# Patient Record
Sex: FEMALE | Race: BLACK OR AFRICAN AMERICAN | NOT HISPANIC OR LATINO | Employment: FULL TIME | URBAN - METROPOLITAN AREA
[De-identification: names, ages, dates, MRNs, and addresses within clinical notes are randomized per-mention and may not be internally consistent; named-entity substitution may affect disease eponyms.]

---

## 2018-06-29 ENCOUNTER — HOSPITAL ENCOUNTER (OUTPATIENT)
Dept: RADIOLOGY | Facility: OTHER | Age: 48
Discharge: HOME | End: 2018-06-29
Attending: OBSTETRICS & GYNECOLOGY
Payer: COMMERCIAL

## 2018-06-29 VITALS — HEIGHT: 62 IN | BODY MASS INDEX: 29.08 KG/M2 | WEIGHT: 158 LBS

## 2018-06-29 DIAGNOSIS — Z12.31 VISIT FOR SCREENING MAMMOGRAM: ICD-10-CM

## 2018-06-29 PROCEDURE — 77067 SCR MAMMO BI INCL CAD: CPT | Mod: 26,,, | Performed by: RADIOLOGY

## 2018-06-29 PROCEDURE — 77067 SCR MAMMO BI INCL CAD: CPT | Mod: TC

## 2018-06-29 PROCEDURE — 77063 BREAST TOMOSYNTHESIS BI: CPT | Mod: 26,,, | Performed by: RADIOLOGY

## 2018-11-25 ENCOUNTER — OFFICE VISIT (OUTPATIENT)
Dept: URGENT CARE | Facility: CLINIC | Age: 48
End: 2018-11-25
Payer: COMMERCIAL

## 2018-11-25 VITALS
SYSTOLIC BLOOD PRESSURE: 129 MMHG | HEIGHT: 62 IN | OXYGEN SATURATION: 99 % | BODY MASS INDEX: 26.68 KG/M2 | WEIGHT: 145 LBS | DIASTOLIC BLOOD PRESSURE: 78 MMHG | HEART RATE: 72 BPM | RESPIRATION RATE: 16 BRPM | TEMPERATURE: 98 F

## 2018-11-25 DIAGNOSIS — K21.9 GASTROESOPHAGEAL REFLUX DISEASE, ESOPHAGITIS PRESENCE NOT SPECIFIED: Primary | ICD-10-CM

## 2018-11-25 DIAGNOSIS — M79.601 RIGHT ARM PAIN: ICD-10-CM

## 2018-11-25 PROCEDURE — 99203 OFFICE O/P NEW LOW 30 MIN: CPT | Mod: S$GLB,,, | Performed by: NURSE PRACTITIONER

## 2018-11-25 PROCEDURE — 3008F BODY MASS INDEX DOCD: CPT | Mod: CPTII,S$GLB,, | Performed by: NURSE PRACTITIONER

## 2018-11-25 RX ORDER — LIDOCAINE HYDROCHLORIDE 20 MG/ML
5 SOLUTION OROPHARYNGEAL
Status: COMPLETED | OUTPATIENT
Start: 2018-11-25 | End: 2018-11-25

## 2018-11-25 RX ORDER — DIPHENHYDRAMINE HCL 12.5MG/5ML
10 LIQUID (ML) ORAL
Status: COMPLETED | OUTPATIENT
Start: 2018-11-25 | End: 2018-11-25

## 2018-11-25 RX ORDER — MAG HYDROX/ALUMINUM HYD/SIMETH 200-200-20
15 SUSPENSION, ORAL (FINAL DOSE FORM) ORAL
Status: COMPLETED | OUTPATIENT
Start: 2018-11-25 | End: 2018-11-25

## 2018-11-25 RX ORDER — CYCLOBENZAPRINE HCL 5 MG
5 TABLET ORAL 3 TIMES DAILY PRN
Qty: 20 TABLET | Refills: 0 | Status: SHIPPED | OUTPATIENT
Start: 2018-11-25 | End: 2018-12-05

## 2018-11-25 RX ORDER — OMEPRAZOLE 20 MG/1
20 CAPSULE, DELAYED RELEASE ORAL 2 TIMES DAILY
Qty: 60 CAPSULE | Refills: 1 | Status: SHIPPED | OUTPATIENT
Start: 2018-11-25 | End: 2020-08-25

## 2018-11-25 RX ORDER — SIMETHICONE 80 MG
80 TABLET,CHEWABLE ORAL EVERY 6 HOURS PRN
Refills: 0 | COMMUNITY
Start: 2018-11-25 | End: 2020-08-25

## 2018-11-25 RX ADMIN — Medication 10 MG: at 10:11

## 2018-11-25 RX ADMIN — Medication 15 ML: at 10:11

## 2018-11-25 RX ADMIN — LIDOCAINE HYDROCHLORIDE 5 ML: 20 SOLUTION OROPHARYNGEAL at 10:11

## 2018-11-25 NOTE — PATIENT INSTRUCTIONS
Tips to Control Acid Reflux    To control acid reflux, youll need to make some basic diet and lifestyle changes. The simple steps outlined below may be all youll need to ease discomfort.  Watch what you eat  · Avoid fatty foods and spicy foods.  · Eat fewer acidic foods, such as citrus and tomato-based foods. These can increase symptoms.  · Limit drinking alcohol, caffeine, and fizzy beverages. All increase acid reflux.  · Try limiting chocolate, peppermint, and spearmint. These can worsen acid reflux in some people.  Watch when you eat  · Avoid lying down for 3 hours after eating.  · Do not snack before going to bed.  Raise your head  Raising your head and upper body by 4 to 6 inches helps limit reflux when youre lying down. Put blocks under the head of your bed frame to raise it.  Other changes  · Lose weight, if you need to  · Dont exercise near bedtime  · Avoid tight-fitting clothes  · Limit aspirin and ibuprofen  · Stop smoking   Date Last Reviewed: 7/1/2016 © 2000-2017 [employer]. [address] [place] [address]. All rights reserved. This information is not intended as a substitute for professional medical care. Always follow your healthcare professional's instructions.        Muscle Strain in the Extremities  A muscle strain is a stretching and tearing of muscle fibers. This causes pain, especially when you move that muscle. There may also be some swelling and bruising.  Home care  · Keep the hurt area raised to reduce pain and swelling. This is especially important during the first 48 hours.  · Apply an ice pack over the injured area for 15 to 20 minutes every 3 to 6 hours. You should do this for the first 24 to 48 hours. You can make an ice pack by filling a plastic bag that seals at the top with ice cubes and then wrapping it with a thin towel. Be careful not to injure your skin with the ice treatments. Ice should never be applied directly to skin. Continue the use of ice  packs for relief of pain and swelling as needed. After 48 hours, apply heat (warm shower or warm bath) for 15 to 20 minutes several times a day, or alternate ice and heat.  · You may use over-the-counter pain medicine to control pain, unless another medicine was prescribed. If you have chronic liver or kidney disease or ever had a stomach ulcer or GI bleeding, talk with your healthcare provider before using these medicines.  · For leg strains: If crutches have been recommended, dont put full weight on the hurt leg until you can do so without pain. You can return to sports when you are able to hop and run on the injured leg without pain.  Follow-up care  Follow up with your healthcare provider, or as advised.  When to seek medical advice  Call your healthcare provider right away if any of these occur:  · The toes of the injured leg become swollen, cold, blue, numb, or tingly  · Pain or swelling increases  Date Last Reviewed: 11/19/2015  © 1270-3716 [employer]. [address] [place] [address]. All rights reserved. This information is not intended as a substitute for professional medical care. Always follow your healthcare professional's instructions.    Please return here or go to the Emergency Department for any concerns or worsening of condition.  If you were prescribed antibiotics, please take them to completion.  If you were prescribed a narcotic medication, do not drive or operate heavy equipment or machinery while taking these medications.  Please drink plenty of fluids.  Please get plenty of rest.  If you were not prescribed any reflux medications, it is OK to take over the counter Pepcid or Zantac or Prilosec as directed.  Please follow up with your primary care doctor or specialist as needed.    If you  smoke, please stop smoking.

## 2018-11-25 NOTE — PROGRESS NOTES
"Subjective:       Patient ID: Maggie Evans is a 48 y.o. female.    Vitals:  height is 5' 2" (1.575 m) and weight is 65.8 kg (145 lb). Her temperature is 98.3 °F (36.8 °C). Her blood pressure is 129/78 and her pulse is 72. Her respiration is 16 and oxygen saturation is 99%.     Chief Complaint: Chest Pain    Per pt:    RIGHT arm soreness x 4-5 mo. Intensifies when leaning, laying on the RIGHT side    RIGHT-sided CP x approx. 2 mo      Chest Pain    This is a new problem. The current episode started more than 1 month ago. The onset quality is gradual. Pertinent negatives include no abdominal pain, back pain, fever, nausea, palpitations, shortness of breath, syncope or vomiting.       Constitution: Negative for chills, fatigue, fever and international travel in last 60 days.   HENT: Negative for trouble swallowing.    Neck: Negative for neck pain.   Cardiovascular: Positive for chest pain. Negative for chest trauma, leg swelling, palpitations and passing out.   Respiratory: Negative for sleep apnea and shortness of breath.    Gastrointestinal: Negative for abdominal pain, nausea, vomiting and heartburn.   Genitourinary: Negative for history of kidney stones.   Musculoskeletal: Positive for joint pain. Negative for back pain.   Skin: Negative for rash.   Neurological: Negative for light-headedness and passing out.   Hematologic/Lymphatic: Negative for history of blood clots.   Psychiatric/Behavioral: Negative for nervous/anxious. The patient is not nervous/anxious.        Objective:      Physical Exam   Constitutional: She is oriented to person, place, and time. She appears well-developed and well-nourished. She is cooperative.  Non-toxic appearance. She does not appear ill. No distress.   HENT:   Head: Normocephalic and atraumatic.   Right Ear: Hearing, tympanic membrane, external ear and ear canal normal.   Left Ear: Hearing, tympanic membrane, external ear and ear canal normal.   Nose: Nose normal. No mucosal " edema, rhinorrhea or nasal deformity. No epistaxis. Right sinus exhibits no maxillary sinus tenderness and no frontal sinus tenderness. Left sinus exhibits no maxillary sinus tenderness and no frontal sinus tenderness.   Mouth/Throat: Uvula is midline, oropharynx is clear and moist and mucous membranes are normal. No trismus in the jaw. Normal dentition. No uvula swelling. No posterior oropharyngeal erythema.   Eyes: Conjunctivae and lids are normal. Right eye exhibits no discharge. Left eye exhibits no discharge. No scleral icterus.   Sclera clear bilat   Neck: Trachea normal, normal range of motion, full passive range of motion without pain and phonation normal. Neck supple.   Cardiovascular: Normal rate, regular rhythm, normal heart sounds, intact distal pulses and normal pulses.   Pulmonary/Chest: Effort normal and breath sounds normal. No stridor. No respiratory distress. She has no decreased breath sounds. She has no wheezes. She has no rhonchi. She has no rales.   Abdominal: Soft. Normal appearance and bowel sounds are normal. She exhibits no distension, no pulsatile midline mass and no mass. There is no tenderness.   Musculoskeletal: Normal range of motion. She exhibits no edema or deformity.        Right shoulder: She exhibits pain and spasm.        Right upper arm: She exhibits tenderness.   Neurological: She is alert and oriented to person, place, and time. She exhibits normal muscle tone. Coordination normal.   Skin: Skin is warm, dry and intact. She is not diaphoretic. No pallor.   Psychiatric: She has a normal mood and affect. Her speech is normal and behavior is normal. Judgment and thought content normal. Cognition and memory are normal.   Nursing note and vitals reviewed.      Assessment:       1. Gastroesophageal reflux disease, esophagitis presence not specified    2. Right arm pain        Plan:         Gastroesophageal reflux disease, esophagitis presence not specified  -     Ambulatory referral  to Gastroenterology    Right arm pain  -     Ambulatory referral to Orthopedics    Other orders  -     lidocaine HCl 2% oral solution 5 mL  -     diphenhydrAMINE 12.5 mg/5 mL liquid 10 mg  -     aluminum-magnesium hydroxide-simethicone 200-200-20 mg/5 mL suspension 15 mL  -     omeprazole (PRILOSEC) 20 MG capsule; Take 1 capsule (20 mg total) by mouth 2 (two) times daily.  Dispense: 60 capsule; Refill: 1  -     simethicone (MYLICON) 80 MG chewable tablet; Take 1 tablet (80 mg total) by mouth every 6 (six) hours as needed for Flatulence.; Refill: 0  -     cyclobenzaprine (FLEXERIL) 5 MG tablet; Take 1 tablet (5 mg total) by mouth 3 (three) times daily as needed for Muscle spasms.  Dispense: 20 tablet; Refill: 0      Pt right sided CP dissipated after gi cocktail.  Etiology most likely GERD.  Will refer to gi for CP.  Right arm and shoulder hurt and with testing it is suggestive of a rotator cuff problem.  Will refer to ortho for evaluation.      Tips to Control Acid Reflux    To control acid reflux, youll need to make some basic diet and lifestyle changes. The simple steps outlined below may be all youll need to ease discomfort.  Watch what you eat  · Avoid fatty foods and spicy foods.  · Eat fewer acidic foods, such as citrus and tomato-based foods. These can increase symptoms.  · Limit drinking alcohol, caffeine, and fizzy beverages. All increase acid reflux.  · Try limiting chocolate, peppermint, and spearmint. These can worsen acid reflux in some people.  Watch when you eat  · Avoid lying down for 3 hours after eating.  · Do not snack before going to bed.  Raise your head  Raising your head and upper body by 4 to 6 inches helps limit reflux when youre lying down. Put blocks under the head of your bed frame to raise it.  Other changes  · Lose weight, if you need to  · Dont exercise near bedtime  · Avoid tight-fitting clothes  · Limit aspirin and ibuprofen  · Stop smoking   Date Last Reviewed: 7/1/2016  ©  2806-4603 [employer]. [address] [place] [address], [place] [address]. All rights reserved. This information is not intended as a substitute for professional medical care. Always follow your healthcare professional's instructions.        Muscle Strain in the Extremities  A muscle strain is a stretching and tearing of muscle fibers. This causes pain, especially when you move that muscle. There may also be some swelling and bruising.  Home care  · Keep the hurt area raised to reduce pain and swelling. This is especially important during the first 48 hours.  · Apply an ice pack over the injured area for 15 to 20 minutes every 3 to 6 hours. You should do this for the first 24 to 48 hours. You can make an ice pack by filling a plastic bag that seals at the top with ice cubes and then wrapping it with a thin towel. Be careful not to injure your skin with the ice treatments. Ice should never be applied directly to skin. Continue the use of ice packs for relief of pain and swelling as needed. After 48 hours, apply heat (warm shower or warm bath) for 15 to 20 minutes several times a day, or alternate ice and heat.  · You may use over-the-counter pain medicine to control pain, unless another medicine was prescribed. If you have chronic liver or kidney disease or ever had a stomach ulcer or GI bleeding, talk with your healthcare provider before using these medicines.  · For leg strains: If crutches have been recommended, dont put full weight on the hurt leg until you can do so without pain. You can return to sports when you are able to hop and run on the injured leg without pain.  Follow-up care  Follow up with your healthcare provider, or as advised.  When to seek medical advice  Call your healthcare provider right away if any of these occur:  · The toes of the injured leg become swollen, cold, blue, numb, or tingly  · Pain or swelling increases  Date Last Reviewed: 11/19/2015  © 2948-8790 [employer]  [address] [place] [address]. All rights reserved. This information is not intended as a substitute for professional medical care. Always follow your healthcare professional's instructions.    Please return here or go to the Emergency Department for any concerns or worsening of condition.  If you were prescribed antibiotics, please take them to completion.  If you were prescribed a narcotic medication, do not drive or operate heavy equipment or machinery while taking these medications.  Please drink plenty of fluids.  Please get plenty of rest.  If you were not prescribed any reflux medications, it is OK to take over the counter Pepcid or Zantac or Prilosec as directed.  Please follow up with your primary care doctor or specialist as needed.    If you  smoke, please stop smoking.

## 2018-12-03 ENCOUNTER — TELEPHONE (OUTPATIENT)
Dept: ORTHOPEDICS | Facility: CLINIC | Age: 48
End: 2018-12-03

## 2018-12-03 NOTE — TELEPHONE ENCOUNTER
Notified pt. Regarding appointment 12/03/18; need to know more about C/O. RDrake Maxwell PA-C is a trauma PA.  Correct appointment with [facility] with CESAR Batista PA-C on 12/05/18 at 2:15 pm. Patient states verbal understanding and has no further questions.

## 2018-12-04 ENCOUNTER — TELEPHONE (OUTPATIENT)
Dept: ORTHOPEDICS | Facility: CLINIC | Age: 48
End: 2018-12-04

## 2018-12-04 ENCOUNTER — TELEPHONE (OUTPATIENT)
Dept: GASTROENTEROLOGY | Facility: CLINIC | Age: 48
End: 2018-12-04

## 2018-12-04 DIAGNOSIS — K21.9 GASTROESOPHAGEAL REFLUX DISEASE, ESOPHAGITIS PRESENCE NOT SPECIFIED: Primary | ICD-10-CM

## 2018-12-04 NOTE — TELEPHONE ENCOUNTER
----- Message from Mindy B Seipel, RN sent at 12/4/2018  8:51 AM CST -----  Pt ask for appt to be cancelled on 12/17/18 at 1:30pm with Dr. Sheffield. She wants to see her primary care MD 1st.  Thanks

## 2019-07-10 ENCOUNTER — HOSPITAL ENCOUNTER (OUTPATIENT)
Dept: RADIOLOGY | Facility: OTHER | Age: 49
Discharge: HOME | End: 2019-07-10
Attending: OBSTETRICS & GYNECOLOGY
Payer: COMMERCIAL

## 2019-07-10 VITALS — BODY MASS INDEX: 26.68 KG/M2 | WEIGHT: 145 LBS | HEIGHT: 62 IN

## 2019-07-10 DIAGNOSIS — Z12.31 VISIT FOR SCREENING MAMMOGRAM: ICD-10-CM

## 2019-07-10 PROCEDURE — 77067 SCR MAMMO BI INCL CAD: CPT | Mod: 26,,, | Performed by: RADIOLOGY

## 2019-07-10 PROCEDURE — 77063 MAMMO DIGITAL SCREENING BILAT WITH TOMOSYNTHESIS_CAD: ICD-10-PCS | Mod: 26,,, | Performed by: RADIOLOGY

## 2019-07-10 PROCEDURE — 77063 BREAST TOMOSYNTHESIS BI: CPT | Mod: 26,,, | Performed by: RADIOLOGY

## 2019-07-10 PROCEDURE — 77067 MAMMO DIGITAL SCREENING BILAT WITH TOMOSYNTHESIS_CAD: ICD-10-PCS | Mod: 26,,, | Performed by: RADIOLOGY

## 2019-07-10 PROCEDURE — 77067 SCR MAMMO BI INCL CAD: CPT | Mod: TC

## 2019-12-16 ENCOUNTER — OFFICE VISIT (OUTPATIENT)
Dept: OPTOMETRY | Facility: CLINIC | Age: 49
End: 2019-12-16
Payer: COMMERCIAL

## 2019-12-16 DIAGNOSIS — Z46.0 FITTING AND ADJUSTMENT OF SPECTACLES AND CONTACT LENSES: Primary | ICD-10-CM

## 2019-12-16 DIAGNOSIS — Z01.00 EYE EXAM, ROUTINE: Primary | ICD-10-CM

## 2019-12-16 DIAGNOSIS — H52.4 MYOPIA OF BOTH EYES WITH REGULAR ASTIGMATISM AND PRESBYOPIA: ICD-10-CM

## 2019-12-16 DIAGNOSIS — H52.223 MYOPIA OF BOTH EYES WITH REGULAR ASTIGMATISM AND PRESBYOPIA: ICD-10-CM

## 2019-12-16 DIAGNOSIS — H52.13 MYOPIA OF BOTH EYES WITH REGULAR ASTIGMATISM AND PRESBYOPIA: ICD-10-CM

## 2019-12-16 PROCEDURE — 92004 PR EYE EXAM, NEW PATIENT,COMPREHESV: ICD-10-PCS | Mod: S$GLB,,, | Performed by: OPTOMETRIST

## 2019-12-16 PROCEDURE — 92310 CONTACT LENS FITTING OU: CPT | Mod: CSM,,, | Performed by: OPTOMETRIST

## 2019-12-16 PROCEDURE — 92310 PR CONTACT LENS FITTING (NO CHANGE): ICD-10-PCS | Mod: CSM,,, | Performed by: OPTOMETRIST

## 2019-12-16 PROCEDURE — 99999 PR PBB SHADOW E&M-EST. PATIENT-LVL II: ICD-10-PCS | Mod: PBBFAC,,, | Performed by: OPTOMETRIST

## 2019-12-16 PROCEDURE — 92015 DETERMINE REFRACTIVE STATE: CPT | Mod: S$GLB,,, | Performed by: OPTOMETRIST

## 2019-12-16 PROCEDURE — 92015 PR REFRACTION: ICD-10-PCS | Mod: S$GLB,,, | Performed by: OPTOMETRIST

## 2019-12-16 PROCEDURE — 99999 PR PBB SHADOW E&M-EST. PATIENT-LVL II: CPT | Mod: PBBFAC,,, | Performed by: OPTOMETRIST

## 2019-12-16 PROCEDURE — 92004 COMPRE OPH EXAM NEW PT 1/>: CPT | Mod: S$GLB,,, | Performed by: OPTOMETRIST

## 2019-12-16 NOTE — PROGRESS NOTES
HPI     New pt here to establish eye care. Here for routine eye exam. Pt wears   soft c/l (Frequency 55 Toric). Not very satisfied w/ them since they move   frequently, makes it difficult to adjust. Pt broke her most current rx   glasses. Currently wearing older pair. Pt has high astigmatism. No other   ocular complaints.     NO eye sx.   No eye gtts.     CLs DW, 3-4+mo, Opti-free,   Spectera CLs          Last edited by Mahamed Dimas, OD on 12/16/2019  2:19 PM. (History)            Assessment /Plan     For exam results, see Encounter Report.    Eye exam, routine  -Spectera  -PVD left  -Cataract OU    Myopia of both eyes with regular astigmatism and presbyopia  Eyeglass Final Rx     Eyeglass Final Rx       Sphere Cylinder Axis Dist VA Add    Right -8.75 +3.00 100 20/20 +1.75    Left -8.75 +3.50 095 20/20 +1.75    Type:  PAL    Expiration Date:  12/16/2020              Order #2, needs CLFU at dispense    RTC dispense

## 2020-01-06 ENCOUNTER — OFFICE VISIT (OUTPATIENT)
Dept: OPTOMETRY | Facility: CLINIC | Age: 50
End: 2020-01-06
Payer: COMMERCIAL

## 2020-01-06 DIAGNOSIS — H52.4 MYOPIA OF BOTH EYES WITH REGULAR ASTIGMATISM AND PRESBYOPIA: Primary | ICD-10-CM

## 2020-01-06 DIAGNOSIS — H52.13 MYOPIA OF BOTH EYES WITH REGULAR ASTIGMATISM AND PRESBYOPIA: Primary | ICD-10-CM

## 2020-01-06 DIAGNOSIS — H52.223 MYOPIA OF BOTH EYES WITH REGULAR ASTIGMATISM AND PRESBYOPIA: Primary | ICD-10-CM

## 2020-01-06 PROCEDURE — 92499 PR CONTACT LENS F/U LEV 1: ICD-10-PCS | Mod: CSM,,, | Performed by: OPTOMETRIST

## 2020-01-06 PROCEDURE — 99499 NO LOS: ICD-10-PCS | Mod: S$GLB,,, | Performed by: OPTOMETRIST

## 2020-01-06 PROCEDURE — 99499 UNLISTED E&M SERVICE: CPT | Mod: S$GLB,,, | Performed by: OPTOMETRIST

## 2020-01-06 PROCEDURE — 92499 UNLISTED OPH SVC/PROCEDURE: CPT | Mod: CSM,,, | Performed by: OPTOMETRIST

## 2020-01-06 NOTE — PROGRESS NOTES
HPI     clfu and clpu     Last edited by KESHAWN Weiner on 1/6/2020  8:10 AM. (History)            Assessment /Plan     For exam results, see Encounter Report.    Myopia of both eyes with regular astigmatism and presbyopia  -Good initial comfort and VA  -order exchange lens OS only, Ok to dispense  -PHREV      RTC annual

## 2020-01-31 ENCOUNTER — TELEPHONE (OUTPATIENT)
Dept: OPTOMETRY | Facility: CLINIC | Age: 50
End: 2020-01-31

## 2020-01-31 NOTE — TELEPHONE ENCOUNTER
----- Message from KESHAWN Weiner sent at 1/31/2020  8:57 AM CST -----  Regarding: FW: AMANDA 1/30  Ok to final  ----- Message -----  From: Mahamed Dimas OD  Sent: 1/6/2020   8:26 AM CST  To: Judie Irby Staff  Subject: AMANDA 1/30                                       Call for final.

## 2020-01-31 NOTE — TELEPHONE ENCOUNTER
Final    Contact Lens Final Rx     Final Contact Lens Rx       Brand Base Curve Diameter Sphere Cylinder Axis    Right Bauch and Lomb Ultra for Astigmatism 8.60 14.5 -5.50 -2.25 010    Left Bauch and Lomb Ultra for Astigmatism 8.60 14.5 -5.00 -2.75 010    Expiration Date:  1/31/2021    Replacement:  Monthly    Solutions:  OptiFree PureMoist    Wearing Schedule:  Daily wear

## 2020-08-25 ENCOUNTER — OFFICE VISIT (OUTPATIENT)
Dept: INTERNAL MEDICINE | Facility: CLINIC | Age: 50
End: 2020-08-25
Attending: INTERNAL MEDICINE
Payer: COMMERCIAL

## 2020-08-25 VITALS
WEIGHT: 161 LBS | HEART RATE: 88 BPM | HEIGHT: 62 IN | DIASTOLIC BLOOD PRESSURE: 80 MMHG | SYSTOLIC BLOOD PRESSURE: 120 MMHG | OXYGEN SATURATION: 98 % | BODY MASS INDEX: 29.63 KG/M2

## 2020-08-25 DIAGNOSIS — M79.652 LEFT THIGH PAIN: ICD-10-CM

## 2020-08-25 DIAGNOSIS — Z00.00 ROUTINE ADULT HEALTH MAINTENANCE: ICD-10-CM

## 2020-08-25 DIAGNOSIS — Z13.39 SCREENING FOR ALCOHOLISM: ICD-10-CM

## 2020-08-25 DIAGNOSIS — M54.50 RIGHT-SIDED LOW BACK PAIN WITHOUT SCIATICA, UNSPECIFIED CHRONICITY: Primary | ICD-10-CM

## 2020-08-25 DIAGNOSIS — Z13.31 SCREENING FOR DEPRESSION: ICD-10-CM

## 2020-08-25 PROCEDURE — 99386 PREV VISIT NEW AGE 40-64: CPT | Mod: S$GLB,,, | Performed by: INTERNAL MEDICINE

## 2020-08-25 PROCEDURE — G0442 ANNUAL ALCOHOL SCREEN 15 MIN: HCPCS | Mod: S$GLB,,, | Performed by: INTERNAL MEDICINE

## 2020-08-25 PROCEDURE — G0442 PR  ALCOHOL SCREENING: ICD-10-PCS | Mod: S$GLB,,, | Performed by: INTERNAL MEDICINE

## 2020-08-25 PROCEDURE — 99386 PR PREVENTIVE VISIT,NEW,40-64: ICD-10-PCS | Mod: S$GLB,,, | Performed by: INTERNAL MEDICINE

## 2020-08-25 PROCEDURE — 3008F BODY MASS INDEX DOCD: CPT | Mod: CPTII,S$GLB,, | Performed by: INTERNAL MEDICINE

## 2020-08-25 PROCEDURE — 3008F PR BODY MASS INDEX (BMI) DOCUMENTED: ICD-10-PCS | Mod: CPTII,S$GLB,, | Performed by: INTERNAL MEDICINE

## 2020-08-25 NOTE — PATIENT INSTRUCTIONS
Tips for Healthy Living and Routine Preventative Care - 2020                                                            (These guidelines are intended for healthy adults)      1. Exercise  Exercise aerobically with a target heart rate of (220-age) x 0.8  Exercise 30-45 minutes on most days of the week    2. Diet and Supplements- All supplements can be obtained through a varied, healthy diet   Calcium: 1,000 - 1,200 mg each day   8 oz milk, Calcium fortified O.J., or Yogurt = 300 mg, 1oz of cheese =100-200 mg  Vitamin D: 800 iu each day- Can probably be obtained by 30 min. of direct sunlight    each day             3 oz. [place] = 800 iu,  3 oz. Tuna =150 iu, Milk or fortified O.J. = 120 iu  Fish oil: 1-2 grams each day or about 840 mg of EPA and DHA (Omega-3 fatty acids) each day             3 oz. [place]=2 grams,  3 oz. Tuna=1.3 grams,  3 oz. drained light Tuna= 0.25 grams  Folic acid 800 mcg each day for all women planning or capable of pregnancy    3. Lifestyle  Alcohol: 1 drink = 12 oz. domestic beer, 4 oz. wine, or 1 oz. hard (80 proof) liquor             Males: </= 14 drinks per week with no more than 4 in any one day             Females: </= 7 drinks per week with no more than 3 in any one day  Salt: 1.2 - 3 grams of Sodium each day.  Tobacco: Dont smoke, or quit smoking (discuss with your doctor)  Depression: If you feel depressed discuss with your doctor  Weight: Maintain a healthy body weight. Stay within 10% of:             Males: 106 lbs. + 6 lbs per inch height above 5 feet             Females: 100 lbs + 5 lbs per inch height above 5 feet    4. Routine tests  Blood pressure check at each visit, or at least once each year  HIV screening (one time) if less than 65 years old  Hepatitis C screen (one time) between the age of 18 and 79  Cholesterol screening every 3 years starting at age 21  Glucose check every 2-3 years starting at age 45  TSH (thyroid screen) every 2 years starting at age 50  Colonoscopy at  age 50, and repeat every 10 years until age 75  Vision screen at age 65    Females:   Gyn exam with cervical HPV test every 3 years or Pap smear every three years starting at age 25                  Stop screening at age 65 if past 3 exams were normal                  No screening for women who have had a hysterectomy with removal of cervix  Mammogram every 1-2 years starting at age 40 (or age 50) until age 75.  Bone density scan at about age 65      Males:  Consider PSA screening annually at age 50, age 45 for  Americans, until age 75                 5. Immunizations  Influenza vaccine every year in the fall, especially if >50 or with a chronic disease  Tetnus/Diptheria/Pertusis (Tdap) vaccine once (after the age of 18), then Tetnus/Diptheria (Td) vaccine every 10 years  Shingles (Shingrix) vaccine after age 50 and get a 2nd dose after 2-6 months  Pneumonia vaccine at age 65. 2nd Pneumonia vaccine at least 1 year later (1st should be Prevnar-13, and 2nd should be Pneumovax-23).
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
## 2021-01-04 DIAGNOSIS — Z12.31 OTHER SCREENING MAMMOGRAM: ICD-10-CM

## 2021-02-25 ENCOUNTER — OFFICE VISIT (OUTPATIENT)
Dept: INTERNAL MEDICINE | Facility: CLINIC | Age: 51
End: 2021-02-25
Attending: INTERNAL MEDICINE
Payer: COMMERCIAL

## 2021-02-25 VITALS
BODY MASS INDEX: 28.16 KG/M2 | OXYGEN SATURATION: 98 % | HEART RATE: 106 BPM | DIASTOLIC BLOOD PRESSURE: 72 MMHG | HEIGHT: 62 IN | SYSTOLIC BLOOD PRESSURE: 118 MMHG | WEIGHT: 153 LBS

## 2021-02-25 DIAGNOSIS — Z00.00 ROUTINE ADULT HEALTH MAINTENANCE: Primary | ICD-10-CM

## 2021-02-25 DIAGNOSIS — E55.9 VITAMIN D DEFICIENCY: ICD-10-CM

## 2021-02-25 DIAGNOSIS — Z13.31 SCREENING FOR DEPRESSION: ICD-10-CM

## 2021-02-25 DIAGNOSIS — M79.652 PAIN OF LEFT THIGH: ICD-10-CM

## 2021-02-25 DIAGNOSIS — Z87.09 HISTORY OF PNEUMOTHORAX: ICD-10-CM

## 2021-02-25 DIAGNOSIS — E03.9 HYPOTHYROIDISM, UNSPECIFIED TYPE: ICD-10-CM

## 2021-02-25 DIAGNOSIS — D51.0 PERNICIOUS ANEMIA: ICD-10-CM

## 2021-02-25 DIAGNOSIS — E78.9 DISORDER OF LIPID METABOLISM: ICD-10-CM
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
## 2021-02-25 DIAGNOSIS — D50.8 OTHER IRON DEFICIENCY ANEMIA: ICD-10-CM

## 2021-02-25 DIAGNOSIS — Z13.39 SCREENING FOR ALCOHOLISM: ICD-10-CM

## 2021-02-25 DIAGNOSIS — R06.02 SOB (SHORTNESS OF BREATH): ICD-10-CM

## 2021-02-25 DIAGNOSIS — R79.89 OTHER SPECIFIED ABNORMAL FINDINGS OF BLOOD CHEMISTRY: ICD-10-CM

## 2021-02-25 DIAGNOSIS — M54.2 NECK PAIN ON LEFT SIDE: ICD-10-CM

## 2021-02-25 PROCEDURE — 3008F PR BODY MASS INDEX (BMI) DOCUMENTED: ICD-10-PCS | Mod: CPTII,S$GLB,, | Performed by: INTERNAL MEDICINE

## 2021-02-25 PROCEDURE — 1160F RVW MEDS BY RX/DR IN RCRD: CPT | Mod: S$GLB,,, | Performed by: INTERNAL MEDICINE

## 2021-02-25 PROCEDURE — 1160F PR REVIEW ALL MEDS BY PRESCRIBER/CLIN PHARMACIST DOCUMENTED: ICD-10-PCS | Mod: S$GLB,,, | Performed by: INTERNAL MEDICINE

## 2021-02-25 PROCEDURE — 99396 PREV VISIT EST AGE 40-64: CPT | Mod: S$GLB,,, | Performed by: INTERNAL MEDICINE

## 2021-02-25 PROCEDURE — 99213 PR OFFICE/OUTPT VISIT, EST, LEVL III, 20-29 MIN: ICD-10-PCS | Mod: 25,S$GLB,, | Performed by: INTERNAL MEDICINE

## 2021-02-25 PROCEDURE — 99396 PR PREVENTIVE VISIT,EST,40-64: ICD-10-PCS | Mod: S$GLB,,, | Performed by: INTERNAL MEDICINE

## 2021-02-25 PROCEDURE — 3008F BODY MASS INDEX DOCD: CPT | Mod: CPTII,S$GLB,, | Performed by: INTERNAL MEDICINE

## 2021-02-25 PROCEDURE — G0442 PR  ALCOHOL SCREENING: ICD-10-PCS | Mod: 59,S$GLB,, | Performed by: INTERNAL MEDICINE

## 2021-02-25 PROCEDURE — 99213 OFFICE O/P EST LOW 20 MIN: CPT | Mod: 25,S$GLB,, | Performed by: INTERNAL MEDICINE

## 2021-02-25 PROCEDURE — G0442 ANNUAL ALCOHOL SCREEN 15 MIN: HCPCS | Mod: 59,S$GLB,, | Performed by: INTERNAL MEDICINE

## 2021-02-26 LAB
25(OH)D3 SERPL-MCNC: 30 NG/ML (ref 30–100)
ALBUMIN SERPL-MCNC: 4.3 G/DL (ref 3.6–5.1)
ALBUMIN/GLOB SERPL: 1.5 (CALC) (ref 1–2.5)
ALP SERPL-CCNC: 84 U/L (ref 37–153)
ALT SERPL-CCNC: 17 U/L (ref 6–29)
AST SERPL-CCNC: 16 U/L (ref 10–35)
BASOPHILS # BLD AUTO: 30 CELLS/UL (ref 0–200)
BASOPHILS NFR BLD AUTO: 0.8 %
BILIRUB SERPL-MCNC: 0.8 MG/DL (ref 0.2–1.2)
BUN SERPL-MCNC: 17 MG/DL (ref 7–25)
BUN/CREAT SERPL: ABNORMAL (CALC) (ref 6–22)
CALCIUM SERPL-MCNC: 9.2 MG/DL (ref 8.6–10.4)
CHLORIDE SERPL-SCNC: 104 MMOL/L (ref 98–110)
CHOLEST SERPL-MCNC: 230 MG/DL
CHOLEST/HDLC SERPL: 3.2 (CALC)
CK SERPL-CCNC: 46 U/L (ref 29–143)
CO2 SERPL-SCNC: 27 MMOL/L (ref 20–32)
CREAT SERPL-MCNC: 0.65 MG/DL (ref 0.5–1.05)
D DIMER PPP FEU-MCNC: 0.41 MCG/ML FEU
EOSINOPHIL # BLD AUTO: 61 CELLS/UL (ref 15–500)
EOSINOPHIL NFR BLD AUTO: 1.6 %
ERYTHROCYTE [DISTWIDTH] IN BLOOD BY AUTOMATED COUNT: 16.3 % (ref 11–15)
GFRSERPLBLD MDRD-ARVRAT: 103 ML/MIN/1.73M2
GLOBULIN SER CALC-MCNC: 2.9 G/DL (CALC) (ref 1.9–3.7)
GLUCOSE SERPL-MCNC: 101 MG/DL (ref 65–99)
HBA1C MFR BLD: 5.6 % OF TOTAL HGB
HCT VFR BLD AUTO: 37.8 % (ref 35–45)
HCV AB S/CO SERPL IA: 0.02
HCV AB SERPL QL IA: NORMAL
HDLC SERPL-MCNC: 73 MG/DL
HGB BLD-MCNC: 13 G/DL (ref 11.7–15.5)
HIV 1+2 AB+HIV1 P24 AG SERPL QL IA: NORMAL
LDLC SERPL CALC-MCNC: 138 MG/DL (CALC)
LYMPHOCYTES # BLD AUTO: 969 CELLS/UL (ref 850–3900)
LYMPHOCYTES NFR BLD AUTO: 25.5 %
MCH RBC QN AUTO: 27.4 PG (ref 27–33)
MCHC RBC AUTO-ENTMCNC: 34.4 G/DL (ref 32–36)
MCV RBC AUTO: 79.7 FL (ref 80–100)
MONOCYTES # BLD AUTO: 266 CELLS/UL (ref 200–950)
MONOCYTES NFR BLD AUTO: 7 %
NEUTROPHILS # BLD AUTO: 2474 CELLS/UL (ref 1500–7800)
NEUTROPHILS NFR BLD AUTO: 65.1 %
NONHDLC SERPL-MCNC: 157 MG/DL (CALC)
PLATELET # BLD AUTO: 313 THOUSAND/UL (ref 140–400)
PMV BLD REES-ECKER: 9.4 FL (ref 7.5–12.5)
POTASSIUM SERPL-SCNC: 3.8 MMOL/L (ref 3.5–5.3)
PROT SERPL-MCNC: 7.2 G/DL (ref 6.1–8.1)
RBC # BLD AUTO: 4.74 MILLION/UL (ref 3.8–5.1)
SODIUM SERPL-SCNC: 141 MMOL/L (ref 135–146)
TRIGL SERPL-MCNC: 87 MG/DL
TSH SERPL-ACNC: 1.38 MIU/L
VIT B12 SERPL-MCNC: 430 PG/ML (ref 200–1100)
WBC # BLD AUTO: 3.8 THOUSAND/UL (ref 3.8–10.8)

## 2021-03-05 ENCOUNTER — HOSPITAL ENCOUNTER (OUTPATIENT)
Dept: RADIOLOGY | Facility: OTHER | Age: 51
Discharge: HOME | End: 2021-03-05
Attending: INTERNAL MEDICINE
Payer: COMMERCIAL

## 2021-03-05 DIAGNOSIS — Z12.31 OTHER SCREENING MAMMOGRAM: ICD-10-CM

## 2021-03-05 PROCEDURE — 77063 MAMMO DIGITAL SCREENING BILAT WITH TOMO: ICD-10-PCS | Mod: 26,,, | Performed by: RADIOLOGY

## 2021-03-05 PROCEDURE — 77067 SCR MAMMO BI INCL CAD: CPT | Mod: 26,,, | Performed by: RADIOLOGY

## 2021-03-05 PROCEDURE — 77067 SCR MAMMO BI INCL CAD: CPT | Mod: TC

## 2021-03-05 PROCEDURE — 77067 MAMMO DIGITAL SCREENING BILAT WITH TOMO: ICD-10-PCS | Mod: 26,,, | Performed by: RADIOLOGY

## 2021-03-05 PROCEDURE — 77063 BREAST TOMOSYNTHESIS BI: CPT | Mod: 26,,, | Performed by: RADIOLOGY

## 2021-04-13 ENCOUNTER — PATIENT MESSAGE (OUTPATIENT)
Dept: RESEARCH | Facility: HOSPITAL | Age: 51
End: 2021-04-13

## 2022-02-25 ENCOUNTER — OFFICE VISIT (OUTPATIENT)
Dept: INTERNAL MEDICINE | Facility: CLINIC | Age: 52
End: 2022-02-25
Attending: INTERNAL MEDICINE
Payer: COMMERCIAL

## 2022-02-25 VITALS
WEIGHT: 140 LBS | OXYGEN SATURATION: 97 % | HEIGHT: 62 IN | HEART RATE: 77 BPM | DIASTOLIC BLOOD PRESSURE: 74 MMHG | BODY MASS INDEX: 25.76 KG/M2 | SYSTOLIC BLOOD PRESSURE: 110 MMHG

## 2022-02-25 DIAGNOSIS — D51.0 PERNICIOUS ANEMIA: ICD-10-CM

## 2022-02-25 DIAGNOSIS — R79.89 OTHER SPECIFIED ABNORMAL FINDINGS OF BLOOD CHEMISTRY: ICD-10-CM

## 2022-02-25 DIAGNOSIS — Z00.00 ROUTINE ADULT HEALTH MAINTENANCE: ICD-10-CM

## 2022-02-25 DIAGNOSIS — H93.8X2 CONGESTION OF LEFT EAR: ICD-10-CM

## 2022-02-25 DIAGNOSIS — E78.9 DISORDER OF LIPID METABOLISM: ICD-10-CM

## 2022-02-25 DIAGNOSIS — Z12.11 COLON CANCER SCREENING: Primary | ICD-10-CM

## 2022-02-25 DIAGNOSIS — Z00.00 ROUTINE ADULT HEALTH MAINTENANCE: Primary | ICD-10-CM

## 2022-02-25 DIAGNOSIS — E03.9 HYPOTHYROIDISM, UNSPECIFIED TYPE: ICD-10-CM

## 2022-02-25 DIAGNOSIS — D50.8 OTHER IRON DEFICIENCY ANEMIA: ICD-10-CM

## 2022-02-25 DIAGNOSIS — E55.9 VITAMIN D DEFICIENCY: ICD-10-CM

## 2022-02-25 PROCEDURE — 1159F PR MEDICATION LIST DOCUMENTED IN MEDICAL RECORD: ICD-10-PCS | Mod: CPTII,S$GLB,, | Performed by: INTERNAL MEDICINE

## 2022-02-25 PROCEDURE — 3008F PR BODY MASS INDEX (BMI) DOCUMENTED: ICD-10-PCS | Mod: CPTII,S$GLB,, | Performed by: INTERNAL MEDICINE

## 2022-02-25 PROCEDURE — 99396 PR PREVENTIVE VISIT,EST,40-64: ICD-10-PCS | Mod: S$GLB,,, | Performed by: INTERNAL MEDICINE

## 2022-02-25 PROCEDURE — 3078F PR MOST RECENT DIASTOLIC BLOOD PRESSURE < 80 MM HG: ICD-10-PCS | Mod: CPTII,S$GLB,, | Performed by: INTERNAL MEDICINE

## 2022-02-25 PROCEDURE — 3074F SYST BP LT 130 MM HG: CPT | Mod: CPTII,S$GLB,, | Performed by: INTERNAL MEDICINE

## 2022-02-25 PROCEDURE — 3008F BODY MASS INDEX DOCD: CPT | Mod: CPTII,S$GLB,, | Performed by: INTERNAL MEDICINE

## 2022-02-25 PROCEDURE — 3078F DIAST BP <80 MM HG: CPT | Mod: CPTII,S$GLB,, | Performed by: INTERNAL MEDICINE

## 2022-02-25 PROCEDURE — 1160F RVW MEDS BY RX/DR IN RCRD: CPT | Mod: CPTII,S$GLB,, | Performed by: INTERNAL MEDICINE

## 2022-02-25 PROCEDURE — 99396 PREV VISIT EST AGE 40-64: CPT | Mod: S$GLB,,, | Performed by: INTERNAL MEDICINE

## 2022-02-25 PROCEDURE — 1160F PR REVIEW ALL MEDS BY PRESCRIBER/CLIN PHARMACIST DOCUMENTED: ICD-10-PCS | Mod: CPTII,S$GLB,, | Performed by: INTERNAL MEDICINE

## 2022-02-25 PROCEDURE — 1159F MED LIST DOCD IN RCRD: CPT | Mod: CPTII,S$GLB,, | Performed by: INTERNAL MEDICINE

## 2022-02-25 PROCEDURE — 3074F PR MOST RECENT SYSTOLIC BLOOD PRESSURE < 130 MM HG: ICD-10-PCS | Mod: CPTII,S$GLB,, | Performed by: INTERNAL MEDICINE

## 2022-02-25 NOTE — PROGRESS NOTES
Subjective:       Patient ID: Maggie Evans is a 51 y.o. female.    Chief Complaint: Annual Exam    Occasionally her left ear feels congested, especially when on an airplane.  She has lost 13 lb in the past year.      Adult Wellness Exam:    Mental Conditions: None  Depression Risk Factors: None  BMI: See Vital signs   Colon screen:    See Health Maintenance Report      Females: Mammogram and PAP: per Gyn                                 Vaccines (Flu, Adacel, Shingrix): See Health Maintenance Report  Routine labs (Cholesterol, Glucose/Hgb A1C, and TSH): ordered.     The patient's current health status is: Good   Patient was educated on all medical problems and routine health maintenance. See Patient Instructions.                               Review of Systems   Constitutional: Negative.    Respiratory: Negative for shortness of breath.    Cardiovascular: Negative for chest pain.         Objective:      Physical Exam  Vitals and nursing note reviewed.   Constitutional:       Appearance: She is well-developed.   HENT:      Head: Normocephalic.      Right Ear: Tympanic membrane and ear canal normal.   Eyes:      Pupils: Pupils are equal, round, and reactive to light.   Cardiovascular:      Rate and Rhythm: Normal rate and regular rhythm.      Heart sounds: Normal heart sounds.   Pulmonary:      Effort: Pulmonary effort is normal.   Neurological:      Mental Status: She is alert.         Assessment:       Problem List Items Addressed This Visit    None     Visit Diagnoses     Colon cancer screening    -  Primary    Relevant Orders    Cologuard Screening (Multitarget Stool DNA)    Routine adult health maintenance        Congestion of left ear              Plan:       Per orders and D/C instructions.     check routine labs.

## 2022-02-25 NOTE — PATIENT INSTRUCTIONS

## 2022-02-25 NOTE — PROGRESS NOTES
Occasionally her left ear feels congested, especially when on an airplane.  She has lost 13 lb in the past year.

## 2022-02-26 LAB
ALBUMIN SERPL-MCNC: 4.4 G/DL (ref 3.6–5.1)
ALBUMIN/GLOB SERPL: 1.4 (CALC) (ref 1–2.5)
ALP SERPL-CCNC: 79 U/L (ref 37–153)
ALT SERPL-CCNC: 40 U/L (ref 6–29)
AST SERPL-CCNC: 32 U/L (ref 10–35)
BASOPHILS # BLD AUTO: 41 CELLS/UL (ref 0–200)
BASOPHILS NFR BLD AUTO: 1.2 %
BILIRUB SERPL-MCNC: 1.3 MG/DL (ref 0.2–1.2)
BUN SERPL-MCNC: 10 MG/DL (ref 7–25)
BUN/CREAT SERPL: ABNORMAL (CALC) (ref 6–22)
CALCIUM SERPL-MCNC: 9.7 MG/DL (ref 8.6–10.4)
CHLORIDE SERPL-SCNC: 102 MMOL/L (ref 98–110)
CHOLEST SERPL-MCNC: 248 MG/DL
CHOLEST/HDLC SERPL: 2.8 (CALC)
CO2 SERPL-SCNC: 30 MMOL/L (ref 20–32)
CREAT SERPL-MCNC: 0.64 MG/DL (ref 0.5–1.05)
EOSINOPHIL # BLD AUTO: 82 CELLS/UL (ref 15–500)
EOSINOPHIL NFR BLD AUTO: 2.4 %
ERYTHROCYTE [DISTWIDTH] IN BLOOD BY AUTOMATED COUNT: 16.3 % (ref 11–15)
GLOBULIN SER CALC-MCNC: 3.2 G/DL (CALC) (ref 1.9–3.7)
GLUCOSE SERPL-MCNC: 92 MG/DL (ref 65–99)
HBA1C MFR BLD: 5.4 % OF TOTAL HGB
HCT VFR BLD AUTO: 39.1 % (ref 35–45)
HDLC SERPL-MCNC: 89 MG/DL
HGB BLD-MCNC: 13.4 G/DL (ref 11.7–15.5)
LDLC SERPL CALC-MCNC: 137 MG/DL (CALC)
LYMPHOCYTES # BLD AUTO: 1054 CELLS/UL (ref 850–3900)
LYMPHOCYTES NFR BLD AUTO: 31 %
MCH RBC QN AUTO: 26.8 PG (ref 27–33)
MCHC RBC AUTO-ENTMCNC: 34.3 G/DL (ref 32–36)
MCV RBC AUTO: 78.2 FL (ref 80–100)
MONOCYTES # BLD AUTO: 241 CELLS/UL (ref 200–950)
MONOCYTES NFR BLD AUTO: 7.1 %
NEUTROPHILS # BLD AUTO: 1982 CELLS/UL (ref 1500–7800)
NEUTROPHILS NFR BLD AUTO: 58.3 %
NONHDLC SERPL-MCNC: 159 MG/DL (CALC)
PLATELET # BLD AUTO: 348 THOUSAND/UL (ref 140–400)
PMV BLD REES-ECKER: 9.7 FL (ref 7.5–12.5)
POTASSIUM SERPL-SCNC: 4.6 MMOL/L (ref 3.5–5.3)
PROT SERPL-MCNC: 7.6 G/DL (ref 6.1–8.1)
RBC # BLD AUTO: 5 MILLION/UL (ref 3.8–5.1)
SODIUM SERPL-SCNC: 141 MMOL/L (ref 135–146)
TRIGL SERPL-MCNC: 117 MG/DL
TSH SERPL-ACNC: 1.63 MIU/L
VIT B12 SERPL-MCNC: 354 PG/ML (ref 200–1100)
WBC # BLD AUTO: 3.4 THOUSAND/UL (ref 3.8–10.8)

## 2022-02-28 ENCOUNTER — PATIENT MESSAGE (OUTPATIENT)
Dept: INTERNAL MEDICINE | Facility: CLINIC | Age: 52
End: 2022-02-28
Payer: COMMERCIAL

## 2022-03-16 DIAGNOSIS — Z12.31 OTHER SCREENING MAMMOGRAM: ICD-10-CM

## 2022-03-25 ENCOUNTER — HOSPITAL ENCOUNTER (OUTPATIENT)
Dept: RADIOLOGY | Facility: OTHER | Age: 52
Discharge: HOME | End: 2022-03-25
Attending: OBSTETRICS & GYNECOLOGY
Payer: COMMERCIAL

## 2022-03-25 DIAGNOSIS — Z12.31 VISIT FOR SCREENING MAMMOGRAM: ICD-10-CM

## 2022-03-25 PROCEDURE — 77067 MAMMO DIGITAL SCREENING BILAT WITH TOMO: ICD-10-PCS | Mod: 26,,, | Performed by: RADIOLOGY

## 2022-03-25 PROCEDURE — 77063 BREAST TOMOSYNTHESIS BI: CPT | Mod: 26,,, | Performed by: RADIOLOGY

## 2022-03-25 PROCEDURE — 77063 MAMMO DIGITAL SCREENING BILAT WITH TOMO: ICD-10-PCS | Mod: 26,,, | Performed by: RADIOLOGY

## 2022-03-25 PROCEDURE — 77067 SCR MAMMO BI INCL CAD: CPT | Mod: 26,,, | Performed by: RADIOLOGY

## 2022-03-25 PROCEDURE — 77067 SCR MAMMO BI INCL CAD: CPT | Mod: TC

## 2022-03-29 DIAGNOSIS — Z12.11 SPECIAL SCREENING FOR MALIGNANT NEOPLASMS, COLON: Primary | ICD-10-CM

## 2022-04-21 LAB — NONINV COLON CA DNA+OCC BLD SCRN STL QL: NEGATIVE

## 2022-06-26 ENCOUNTER — OFFICE VISIT (OUTPATIENT)
Dept: URGENT CARE | Facility: CLINIC | Age: 52
End: 2022-06-26
Payer: COMMERCIAL

## 2022-06-26 VITALS
HEIGHT: 62 IN | WEIGHT: 140 LBS | TEMPERATURE: 98 F | BODY MASS INDEX: 25.76 KG/M2 | HEART RATE: 94 BPM | SYSTOLIC BLOOD PRESSURE: 132 MMHG | DIASTOLIC BLOOD PRESSURE: 91 MMHG | OXYGEN SATURATION: 97 %

## 2022-06-26 DIAGNOSIS — K64.4 EXTERNAL HEMORRHOID: Primary | ICD-10-CM

## 2022-06-26 PROCEDURE — 3075F SYST BP GE 130 - 139MM HG: CPT | Mod: CPTII,S$GLB,, | Performed by: NURSE PRACTITIONER

## 2022-06-26 PROCEDURE — 99213 PR OFFICE/OUTPT VISIT, EST, LEVL III, 20-29 MIN: ICD-10-PCS | Mod: S$GLB,,, | Performed by: NURSE PRACTITIONER

## 2022-06-26 PROCEDURE — 3008F PR BODY MASS INDEX (BMI) DOCUMENTED: ICD-10-PCS | Mod: CPTII,S$GLB,, | Performed by: NURSE PRACTITIONER

## 2022-06-26 PROCEDURE — 3075F PR MOST RECENT SYSTOLIC BLOOD PRESS GE 130-139MM HG: ICD-10-PCS | Mod: CPTII,S$GLB,, | Performed by: NURSE PRACTITIONER

## 2022-06-26 PROCEDURE — 1159F PR MEDICATION LIST DOCUMENTED IN MEDICAL RECORD: ICD-10-PCS | Mod: CPTII,S$GLB,, | Performed by: NURSE PRACTITIONER

## 2022-06-26 PROCEDURE — 3080F PR MOST RECENT DIASTOLIC BLOOD PRESSURE >= 90 MM HG: ICD-10-PCS | Mod: CPTII,S$GLB,, | Performed by: NURSE PRACTITIONER

## 2022-06-26 PROCEDURE — 3008F BODY MASS INDEX DOCD: CPT | Mod: CPTII,S$GLB,, | Performed by: NURSE PRACTITIONER

## 2022-06-26 PROCEDURE — 3044F PR MOST RECENT HEMOGLOBIN A1C LEVEL <7.0%: ICD-10-PCS | Mod: CPTII,S$GLB,, | Performed by: NURSE PRACTITIONER

## 2022-06-26 PROCEDURE — 1160F RVW MEDS BY RX/DR IN RCRD: CPT | Mod: CPTII,S$GLB,, | Performed by: NURSE PRACTITIONER

## 2022-06-26 PROCEDURE — 1160F PR REVIEW ALL MEDS BY PRESCRIBER/CLIN PHARMACIST DOCUMENTED: ICD-10-PCS | Mod: CPTII,S$GLB,, | Performed by: NURSE PRACTITIONER

## 2022-06-26 PROCEDURE — 3044F HG A1C LEVEL LT 7.0%: CPT | Mod: CPTII,S$GLB,, | Performed by: NURSE PRACTITIONER

## 2022-06-26 PROCEDURE — 1159F MED LIST DOCD IN RCRD: CPT | Mod: CPTII,S$GLB,, | Performed by: NURSE PRACTITIONER

## 2022-06-26 PROCEDURE — 3080F DIAST BP >= 90 MM HG: CPT | Mod: CPTII,S$GLB,, | Performed by: NURSE PRACTITIONER

## 2022-06-26 PROCEDURE — 99213 OFFICE O/P EST LOW 20 MIN: CPT | Mod: S$GLB,,, | Performed by: NURSE PRACTITIONER

## 2022-06-26 NOTE — PROGRESS NOTES
"Subjective:       Patient ID: Maggie Evans is a 51 y.o. female.    Vitals:  height is 5' 2" (1.575 m) and weight is 63.5 kg (140 lb). Her oral temperature is 97.7 °F (36.5 °C). Her blood pressure is 132/91 (abnormal) and her pulse is 94. Her oxygen saturation is 97%.     Chief Complaint: Abscess    51-year-old female presents to clinic for evaluation of suspected abscess to buttocks.  Patient states that she noticed a bump around her rectum approximately 3-4 days ago.  She states that she has had abscesses to her inner thighs in the past.  She states that she attempted to self treat with antiseptic Dr. Urban, which caused burning to her skin.  She has been applying Neosporin.  She denies pain, states that she never had pain, she just noticed a bump near her rectum.  She is awake and alert, answers questions appropriately, no acute distress noted on today's visit.    Abscess  Chronicity:  NewProgression Since Onset: gradually improving  Abscess location: buttocks.  Associated Symptoms: no fever, no chills, no sweats  Characteristics: not draining, no itching, not painful, no redness, no dryness, no scaling, no peeling, no bruising and no blistering    Treatments Tried:  Warm water soaks (neosporin, antiseptic)  Relieved by:  Nothing      Constitution: Negative for activity change, appetite change, chills, sweating, fatigue and fever.   Cardiovascular: Negative for chest pain.   Gastrointestinal: Negative for abdominal pain, nausea, vomiting, bright red blood in stool, dark colored stools, rectal bleeding, rectal pain and hemorrhoids.   Skin: Positive for wound and abscess.   Neurological: Negative for dizziness and light-headedness.       Objective:      Physical Exam   Constitutional:  Non-toxic appearance. She does not appear ill. No distress.   HENT:   Head: Normocephalic and atraumatic.   Ears:   Right Ear: External ear normal.   Left Ear: External ear normal.   Cardiovascular: Normal rate. "   Pulmonary/Chest: Effort normal. No respiratory distress.   Abdominal: Normal appearance.   Genitourinary:         Neurological: She is alert.   Skin: Skin is warm, dry and not diaphoretic.   Nursing note and vitals reviewed.chaperone present           Assessment:       1. External hemorrhoid          Plan:         External hemorrhoid    - After examination, discussed with patient that I do not see nor suspect abscess, however there is a non tender, non inflamed external hemorrhoid noted at the site where patient initially felt the bump.  There is some discolored skin noted surrounding hemorrhoid, at the area of patient's concern for burning her skin with treatment.  Skin is healing well, no signs of infection.  Discussed with patient that she can try over-the-counter hemorrhoidal cream, that may help to shrink the tissue.  Discussed follow-up with PCP, or return to clinic for any concerns.  Patient verbalized understanding and is in agreement with plan.    Patient Instructions   - You must understand that you have received an Urgent Care treatment only and that you may be released before all of your medical problems are known or treated.   - You, the patient, will arrange for follow up care as instructed.   - If your condition worsens or fails to improve we recommend that you receive another evaluation at the ER immediately or contact your PCP to discuss your concerns or return here.

## 2023-02-23 ENCOUNTER — OFFICE VISIT (OUTPATIENT)
Dept: INTERNAL MEDICINE | Facility: CLINIC | Age: 53
End: 2023-02-23
Attending: INTERNAL MEDICINE
Payer: COMMERCIAL

## 2023-02-23 VITALS
DIASTOLIC BLOOD PRESSURE: 78 MMHG | BODY MASS INDEX: 27.79 KG/M2 | HEIGHT: 62 IN | SYSTOLIC BLOOD PRESSURE: 132 MMHG | WEIGHT: 151 LBS | HEART RATE: 89 BPM | OXYGEN SATURATION: 99 %

## 2023-02-23 DIAGNOSIS — D51.0 PERNICIOUS ANEMIA: ICD-10-CM

## 2023-02-23 DIAGNOSIS — R79.89 OTHER SPECIFIED ABNORMAL FINDINGS OF BLOOD CHEMISTRY: ICD-10-CM

## 2023-02-23 DIAGNOSIS — Z00.00 ROUTINE ADULT HEALTH MAINTENANCE: Primary | ICD-10-CM

## 2023-02-23 DIAGNOSIS — E55.9 VITAMIN D DEFICIENCY: ICD-10-CM

## 2023-02-23 DIAGNOSIS — E78.9 DISORDER OF LIPID METABOLISM: ICD-10-CM

## 2023-02-23 DIAGNOSIS — E03.9 HYPOTHYROIDISM, UNSPECIFIED TYPE: ICD-10-CM

## 2023-02-23 DIAGNOSIS — R74.01 ELEVATED TRANSAMINASE LEVEL: ICD-10-CM

## 2023-02-23 DIAGNOSIS — D50.8 OTHER IRON DEFICIENCY ANEMIA: ICD-10-CM

## 2023-02-23 PROCEDURE — 1159F PR MEDICATION LIST DOCUMENTED IN MEDICAL RECORD: ICD-10-PCS | Mod: CPTII,S$GLB,, | Performed by: INTERNAL MEDICINE

## 2023-02-23 PROCEDURE — 3044F HG A1C LEVEL LT 7.0%: CPT | Mod: CPTII,S$GLB,, | Performed by: INTERNAL MEDICINE

## 2023-02-23 PROCEDURE — 3075F SYST BP GE 130 - 139MM HG: CPT | Mod: CPTII,S$GLB,, | Performed by: INTERNAL MEDICINE

## 2023-02-23 PROCEDURE — 99396 PR PREVENTIVE VISIT,EST,40-64: ICD-10-PCS | Mod: S$GLB,,, | Performed by: INTERNAL MEDICINE

## 2023-02-23 PROCEDURE — 3008F BODY MASS INDEX DOCD: CPT | Mod: CPTII,S$GLB,, | Performed by: INTERNAL MEDICINE

## 2023-02-23 PROCEDURE — 3008F PR BODY MASS INDEX (BMI) DOCUMENTED: ICD-10-PCS | Mod: CPTII,S$GLB,, | Performed by: INTERNAL MEDICINE

## 2023-02-23 PROCEDURE — 1159F MED LIST DOCD IN RCRD: CPT | Mod: CPTII,S$GLB,, | Performed by: INTERNAL MEDICINE

## 2023-02-23 PROCEDURE — 3075F PR MOST RECENT SYSTOLIC BLOOD PRESS GE 130-139MM HG: ICD-10-PCS | Mod: CPTII,S$GLB,, | Performed by: INTERNAL MEDICINE

## 2023-02-23 PROCEDURE — 1160F RVW MEDS BY RX/DR IN RCRD: CPT | Mod: CPTII,S$GLB,, | Performed by: INTERNAL MEDICINE

## 2023-02-23 PROCEDURE — 99396 PREV VISIT EST AGE 40-64: CPT | Mod: S$GLB,,, | Performed by: INTERNAL MEDICINE

## 2023-02-23 PROCEDURE — 3078F DIAST BP <80 MM HG: CPT | Mod: CPTII,S$GLB,, | Performed by: INTERNAL MEDICINE

## 2023-02-23 PROCEDURE — 3044F PR MOST RECENT HEMOGLOBIN A1C LEVEL <7.0%: ICD-10-PCS | Mod: CPTII,S$GLB,, | Performed by: INTERNAL MEDICINE

## 2023-02-23 PROCEDURE — 3078F PR MOST RECENT DIASTOLIC BLOOD PRESSURE < 80 MM HG: ICD-10-PCS | Mod: CPTII,S$GLB,, | Performed by: INTERNAL MEDICINE

## 2023-02-23 PROCEDURE — 1160F PR REVIEW ALL MEDS BY PRESCRIBER/CLIN PHARMACIST DOCUMENTED: ICD-10-PCS | Mod: CPTII,S$GLB,, | Performed by: INTERNAL MEDICINE

## 2023-02-23 NOTE — PATIENT INSTRUCTIONS
Go to [employer]      Tips for Healthy Living and Routine Preventative Care - 2023                                                            (These guidelines are intended for healthy adults)      1. Exercise  Exercise aerobically with a target heart rate of (220-age) x 0.8  Exercise 30-45 minutes on most days of the week    2. Diet and Supplements- All supplements can be obtained through a varied, healthy diet   Calcium: 1,000 - 1,200 mg each day   8 oz milk or Calcium fortified O.J. = 300, 8 oz Yogurt = 400 mg, 1 oz of cheese =100-200 mg              8 oz Oatmeal = 215 mg, 3 oz [place] = 240 mg  Vitamin D: 800 iu each day- Can probably be obtained by 30 min. of direct sunlight    each day             3 oz. [place] = 800 iu,  3 oz. Tuna =150 iu, Milk or fortified O.J. = 120 iu  Fish oil: 1-2 grams each day or about 840 mg of EPA and DHA (Omega-3 fatty acids) each day             3 oz. [place]=2 grams,  3 oz. Tuna=1.3 grams,  3 oz. drained light Tuna= 0.25 grams  Folic acid 800 mcg each day for all women planning or capable of pregnancy    3. Lifestyle  Alcohol: 1 drink = 12 oz. domestic beer, 4 oz. wine, or 1 oz. hard (80 proof) liquor             Males: </= 14 drinks per week with no more than 4 in any one day             Females: </= 7 drinks per week with no more than 3 in any one day  Salt: 1.2 - 3 grams of Sodium each day.  Tobacco: Dont smoke, or quit smoking (discuss with your doctor)  Depression: If you feel depressed discuss with your doctor  Weight: Maintain a healthy body weight. Stay within 10% of:             Males: 106 lbs. + 6 lbs per inch height above 5 feet             Females: 100 lbs + 5 lbs per inch height above 5 feet    4. Routine tests  Blood pressure check at each visit, or at least once each year  HIV screening (one time) if less than 65 years old  Hepatitis C screen (one time) if less than 80 years old  Cholesterol screening every 3 years starting at age 21  Glucose/Hemaglobin A1C  check every 2-3 years starting at age 45. Check at age 35 if overweight  TSH (thyroid screen) every 2 years starting at age 50  Colonoscopy at age 45, and repeat every 10 years until age 75. Consider screening until age 85. DNA stool test (Cologuard) every 3 years is an acceptable alternative.  Vision screen at age 65    Females:  Gyn exam with cervical HPV test every 3 years or Pap smear every three years starting at age 25                  Stop screening at age 65 if past 3 exams were normal                  No screening for women who have had a hysterectomy with removal of cervix  Mammogram every 1-2 years starting at age 40 (or age 50) until age 75  Consider continuing Mammograms every other year for those older than 75 with a life expectancy of more than 10 years  Bone density scan at about age 65    Males:  PSA screening annually at age 50, age 45 for  Americans, until age 75. Consider annual screening after age 75                   5. Immunizations  Influenza vaccine every year in the fall, especially if >50 or with a chronic disease  Tetanus/Diphtheria/Pertussis (Tdap) vaccine once (after the age of 18), then Tetanus/Diphtheria (Td) or Tdap vaccine every 10 years  Shingles (Shingrix) vaccine after age 50 and get a 2nd dose after 2-6 months  Pneumonia vaccine (Prevnar-20) at age 65       6. Advanced Directive/End of life care planning  You should consider having a signed document which informs physicians and family of your end of life care wishes.  You can go to [employer]/DNR/[place]. Under Step 1 click download AdobePDF and print.  This is the [place] physician order for scope of Treatment (LaPost) form.  You can also request a blank copy of the [place] form from my office.  Bring a copy of the signed document to my office so we can scan it in your medical chart.

## 2023-02-24 LAB
25(OH)D3 SERPL-MCNC: 30 NG/ML (ref 30–100)
ALBUMIN SERPL-MCNC: 4.3 G/DL (ref 3.6–5.1)
ALBUMIN/GLOB SERPL: 1.5 (CALC) (ref 1–2.5)
ALP SERPL-CCNC: 74 U/L (ref 37–153)
ALT SERPL-CCNC: 20 U/L (ref 6–29)
AST SERPL-CCNC: 19 U/L (ref 10–35)
BASOPHILS # BLD AUTO: 40 CELLS/UL (ref 0–200)
BASOPHILS NFR BLD AUTO: 1.1 %
BILIRUB SERPL-MCNC: 0.7 MG/DL (ref 0.2–1.2)
BUN SERPL-MCNC: 10 MG/DL (ref 7–25)
BUN/CREAT SERPL: NORMAL (CALC) (ref 6–22)
CALCIUM SERPL-MCNC: 9.3 MG/DL (ref 8.6–10.4)
CHLORIDE SERPL-SCNC: 103 MMOL/L (ref 98–110)
CHOLEST SERPL-MCNC: 213 MG/DL
CHOLEST/HDLC SERPL: 2.8 (CALC)
CO2 SERPL-SCNC: 28 MMOL/L (ref 20–32)
CREAT SERPL-MCNC: 0.64 MG/DL (ref 0.5–1.03)
EGFR: 106 ML/MIN/1.73M2
EOSINOPHIL # BLD AUTO: 140 CELLS/UL (ref 15–500)
EOSINOPHIL NFR BLD AUTO: 3.9 %
ERYTHROCYTE [DISTWIDTH] IN BLOOD BY AUTOMATED COUNT: 15.6 % (ref 11–15)
GLOBULIN SER CALC-MCNC: 2.9 G/DL (CALC) (ref 1.9–3.7)
GLUCOSE SERPL-MCNC: 93 MG/DL (ref 65–99)
HBA1C MFR BLD: 5.5 % OF TOTAL HGB
HBV SURFACE AG SERPL QL IA: NORMAL
HCT VFR BLD AUTO: 38.7 % (ref 35–45)
HDLC SERPL-MCNC: 76 MG/DL
HGB BLD-MCNC: 12.7 G/DL (ref 11.7–15.5)
IRON SATN MFR SERPL: 31 % (CALC) (ref 16–45)
IRON SERPL-MCNC: 98 MCG/DL (ref 45–160)
LDLC SERPL CALC-MCNC: 121 MG/DL (CALC)
LYMPHOCYTES # BLD AUTO: 1199 CELLS/UL (ref 850–3900)
LYMPHOCYTES NFR BLD AUTO: 33.3 %
MCH RBC QN AUTO: 25.6 PG (ref 27–33)
MCHC RBC AUTO-ENTMCNC: 32.8 G/DL (ref 32–36)
MCV RBC AUTO: 77.9 FL (ref 80–100)
MONOCYTES # BLD AUTO: 349 CELLS/UL (ref 200–950)
MONOCYTES NFR BLD AUTO: 9.7 %
NEUTROPHILS # BLD AUTO: 1872 CELLS/UL (ref 1500–7800)
NEUTROPHILS NFR BLD AUTO: 52 %
NONHDLC SERPL-MCNC: 137 MG/DL (CALC)
PLATELET # BLD AUTO: 293 THOUSAND/UL (ref 140–400)
PMV BLD REES-ECKER: 10.1 FL (ref 7.5–12.5)
POTASSIUM SERPL-SCNC: 3.9 MMOL/L (ref 3.5–5.3)
PROT SERPL-MCNC: 7.2 G/DL (ref 6.1–8.1)
RBC # BLD AUTO: 4.97 MILLION/UL (ref 3.8–5.1)
SODIUM SERPL-SCNC: 139 MMOL/L (ref 135–146)
TIBC SERPL-MCNC: 315 MCG/DL (CALC) (ref 250–450)
TRIGL SERPL-MCNC: 68 MG/DL
TSH SERPL-ACNC: 2.81 MIU/L
VIT B12 SERPL-MCNC: 340 PG/ML (ref 200–1100)
WBC # BLD AUTO: 3.6 THOUSAND/UL (ref 3.8–10.8)

## 2023-02-24 NOTE — PROGRESS NOTES
Subjective:       Patient ID: Maggie Evans is a 52 y.o. female.    Chief Complaint: Annual Exam    She has some pain in her left groin which seems to be improving.  She denies any left hip pain.      Adult Wellness Exam:    Mental Conditions: None  Depression Risk Factors: None  BMI: See Vital signs   Colon screen:    See Health Maintenance Report      Females: Mammogram and PAP: per Gyn                                 Vaccines (Flu, Adacel, Shingrix): See Health Maintenance Report  Routine labs (Cholesterol, Glucose/Hgb A1C, and TSH): ordered.     The patient's current health status is: Good   Patient was educated on routine health maintenance. See Patient Instructions.                               Review of Systems   Constitutional: Negative.    Respiratory:  Negative for shortness of breath.    Cardiovascular:  Negative for chest pain.       Objective:      Physical Exam  Vitals and nursing note reviewed.   Constitutional:       Appearance: She is well-developed.   HENT:      Head: Normocephalic.      Right Ear: Tympanic membrane and ear canal normal.   Eyes:      Pupils: Pupils are equal, round, and reactive to light.   Cardiovascular:      Rate and Rhythm: Normal rate and regular rhythm.      Heart sounds: Normal heart sounds.   Pulmonary:      Effort: Pulmonary effort is normal.   Neurological:      Mental Status: She is alert.       Assessment:       Problem List Items Addressed This Visit    None  Visit Diagnoses       Routine adult health maintenance    -  Primary              Plan:       Per orders and D/C instructions.    Check routine labs.

## 2023-04-18 ENCOUNTER — HOSPITAL ENCOUNTER (OUTPATIENT)
Dept: RADIOLOGY | Facility: OTHER | Age: 53
Discharge: HOME | End: 2023-04-18
Attending: INTERNAL MEDICINE
Payer: COMMERCIAL

## 2023-04-18 DIAGNOSIS — Z12.31 OTHER SCREENING MAMMOGRAM: ICD-10-CM

## 2023-04-18 PROCEDURE — 77063 BREAST TOMOSYNTHESIS BI: CPT | Mod: 26,,, | Performed by: RADIOLOGY

## 2023-04-18 PROCEDURE — 77063 MAMMO DIGITAL SCREENING BILAT WITH TOMO: ICD-10-PCS | Mod: 26,,, | Performed by: RADIOLOGY

## 2023-04-18 PROCEDURE — 77067 MAMMO DIGITAL SCREENING BILAT WITH TOMO: ICD-10-PCS | Mod: 26,,, | Performed by: RADIOLOGY

## 2023-04-18 PROCEDURE — 77067 SCR MAMMO BI INCL CAD: CPT | Mod: TC

## 2023-04-18 PROCEDURE — 77067 SCR MAMMO BI INCL CAD: CPT | Mod: 26,,, | Performed by: RADIOLOGY

## 2024-02-23 ENCOUNTER — OFFICE VISIT (OUTPATIENT)
Dept: INTERNAL MEDICINE | Facility: CLINIC | Age: 54
End: 2024-02-23
Attending: INTERNAL MEDICINE
Payer: COMMERCIAL

## 2024-02-23 ENCOUNTER — LAB VISIT (OUTPATIENT)
Dept: LAB | Facility: OTHER | Age: 54
End: 2024-02-23
Attending: INTERNAL MEDICINE
Payer: COMMERCIAL

## 2024-02-23 VITALS
HEIGHT: 62 IN | SYSTOLIC BLOOD PRESSURE: 118 MMHG | HEART RATE: 70 BPM | OXYGEN SATURATION: 99 % | WEIGHT: 139 LBS | DIASTOLIC BLOOD PRESSURE: 76 MMHG | BODY MASS INDEX: 25.58 KG/M2

## 2024-02-23 DIAGNOSIS — R60.9 EDEMA, UNSPECIFIED TYPE: ICD-10-CM

## 2024-02-23 DIAGNOSIS — Z00.00 ROUTINE ADULT HEALTH MAINTENANCE: ICD-10-CM

## 2024-02-23 DIAGNOSIS — R79.89 OTHER SPECIFIED ABNORMAL FINDINGS OF BLOOD CHEMISTRY: ICD-10-CM

## 2024-02-23 DIAGNOSIS — E03.9 HYPOTHYROIDISM, UNSPECIFIED TYPE: ICD-10-CM

## 2024-02-23 DIAGNOSIS — E55.9 VITAMIN D DEFICIENCY: ICD-10-CM

## 2024-02-23 DIAGNOSIS — D50.8 OTHER IRON DEFICIENCY ANEMIA: ICD-10-CM

## 2024-02-23 DIAGNOSIS — Z00.00 ROUTINE ADULT HEALTH MAINTENANCE: Primary | ICD-10-CM

## 2024-02-23 DIAGNOSIS — D51.0 PERNICIOUS ANEMIA: ICD-10-CM

## 2024-02-23 DIAGNOSIS — R60.9 EDEMA, UNSPECIFIED TYPE: Primary | ICD-10-CM

## 2024-02-23 DIAGNOSIS — E78.9 DISORDER OF LIPID METABOLISM: ICD-10-CM

## 2024-02-23 LAB
25(OH)D3+25(OH)D2 SERPL-MCNC: 33 NG/ML (ref 30–96)
ALBUMIN SERPL BCP-MCNC: 4 G/DL (ref 3.5–5.2)
ALP SERPL-CCNC: 78 U/L (ref 55–135)
ALT SERPL W/O P-5'-P-CCNC: 29 U/L (ref 10–44)
ANION GAP SERPL CALC-SCNC: 6 MMOL/L (ref 8–16)
AST SERPL-CCNC: 25 U/L (ref 10–40)
BASOPHILS # BLD AUTO: 0.05 K/UL (ref 0–0.2)
BASOPHILS NFR BLD: 1.6 % (ref 0–1.9)
BILIRUB SERPL-MCNC: 0.6 MG/DL (ref 0.1–1)
BUN SERPL-MCNC: 10 MG/DL (ref 6–20)
CALCIUM SERPL-MCNC: 9.8 MG/DL (ref 8.7–10.5)
CHLORIDE SERPL-SCNC: 107 MMOL/L (ref 95–110)
CHOLEST SERPL-MCNC: 219 MG/DL (ref 120–199)
CHOLEST/HDLC SERPL: 3.1 {RATIO} (ref 2–5)
CO2 SERPL-SCNC: 29 MMOL/L (ref 23–29)
CREAT SERPL-MCNC: 0.8 MG/DL (ref 0.5–1.4)
D DIMER PPP IA.FEU-MCNC: 0.37 MG/L FEU
DIFFERENTIAL METHOD BLD: ABNORMAL
EOSINOPHIL # BLD AUTO: 0.1 K/UL (ref 0–0.5)
EOSINOPHIL NFR BLD: 3.5 % (ref 0–8)
ERYTHROCYTE [DISTWIDTH] IN BLOOD BY AUTOMATED COUNT: 14.6 % (ref 11.5–14.5)
EST. GFR  (NO RACE VARIABLE): >60 ML/MIN/1.73 M^2
ESTIMATED AVG GLUCOSE: 114 MG/DL (ref 68–131)
GLUCOSE SERPL-MCNC: 97 MG/DL (ref 70–110)
HBA1C MFR BLD: 5.6 % (ref 4–5.6)
HCT VFR BLD AUTO: 37.2 % (ref 37–48.5)
HDLC SERPL-MCNC: 70 MG/DL (ref 40–75)
HDLC SERPL: 32 % (ref 20–50)
HGB BLD-MCNC: 12.5 G/DL (ref 12–16)
IMM GRANULOCYTES # BLD AUTO: 0.01 K/UL (ref 0–0.04)
IMM GRANULOCYTES NFR BLD AUTO: 0.3 % (ref 0–0.5)
LDLC SERPL CALC-MCNC: 132.8 MG/DL (ref 63–159)
LYMPHOCYTES # BLD AUTO: 1.1 K/UL (ref 1–4.8)
LYMPHOCYTES NFR BLD: 35.3 % (ref 18–48)
MCH RBC QN AUTO: 25.6 PG (ref 27–31)
MCHC RBC AUTO-ENTMCNC: 33.6 G/DL (ref 32–36)
MCV RBC AUTO: 76 FL (ref 82–98)
MONOCYTES # BLD AUTO: 0.3 K/UL (ref 0.3–1)
MONOCYTES NFR BLD: 9.3 % (ref 4–15)
NEUTROPHILS # BLD AUTO: 1.6 K/UL (ref 1.8–7.7)
NEUTROPHILS NFR BLD: 50 % (ref 38–73)
NONHDLC SERPL-MCNC: 149 MG/DL
NRBC BLD-RTO: 0 /100 WBC
PLATELET # BLD AUTO: 314 K/UL (ref 150–450)
PMV BLD AUTO: 9.3 FL (ref 9.2–12.9)
POTASSIUM SERPL-SCNC: 4.5 MMOL/L (ref 3.5–5.1)
PROT SERPL-MCNC: 7.6 G/DL (ref 6–8.4)
RBC # BLD AUTO: 4.88 M/UL (ref 4–5.4)
SODIUM SERPL-SCNC: 142 MMOL/L (ref 136–145)
TRIGL SERPL-MCNC: 81 MG/DL (ref 30–150)
TSH SERPL DL<=0.005 MIU/L-ACNC: 1.99 UIU/ML (ref 0.4–4)
VIT B12 SERPL-MCNC: 373 PG/ML (ref 210–950)
WBC # BLD AUTO: 3.12 K/UL (ref 3.9–12.7)

## 2024-02-23 PROCEDURE — 99396 PREV VISIT EST AGE 40-64: CPT | Mod: S$GLB,,, | Performed by: INTERNAL MEDICINE

## 2024-02-23 PROCEDURE — 85379 FIBRIN DEGRADATION QUANT: CPT | Performed by: INTERNAL MEDICINE

## 2024-02-23 PROCEDURE — 36415 COLL VENOUS BLD VENIPUNCTURE: CPT | Performed by: INTERNAL MEDICINE

## 2024-02-23 PROCEDURE — 84443 ASSAY THYROID STIM HORMONE: CPT | Performed by: INTERNAL MEDICINE

## 2024-02-23 PROCEDURE — 1159F MED LIST DOCD IN RCRD: CPT | Mod: CPTII,S$GLB,, | Performed by: INTERNAL MEDICINE

## 2024-02-23 PROCEDURE — 80053 COMPREHEN METABOLIC PANEL: CPT | Performed by: INTERNAL MEDICINE

## 2024-02-23 PROCEDURE — 3008F BODY MASS INDEX DOCD: CPT | Mod: CPTII,S$GLB,, | Performed by: INTERNAL MEDICINE

## 2024-02-23 PROCEDURE — 82306 VITAMIN D 25 HYDROXY: CPT | Performed by: INTERNAL MEDICINE

## 2024-02-23 PROCEDURE — 82607 VITAMIN B-12: CPT | Performed by: INTERNAL MEDICINE

## 2024-02-23 PROCEDURE — 80061 LIPID PANEL: CPT | Performed by: INTERNAL MEDICINE

## 2024-02-23 PROCEDURE — 85025 COMPLETE CBC W/AUTO DIFF WBC: CPT | Performed by: INTERNAL MEDICINE

## 2024-02-23 PROCEDURE — 3074F SYST BP LT 130 MM HG: CPT | Mod: CPTII,S$GLB,, | Performed by: INTERNAL MEDICINE

## 2024-02-23 PROCEDURE — 83036 HEMOGLOBIN GLYCOSYLATED A1C: CPT | Performed by: INTERNAL MEDICINE

## 2024-02-23 PROCEDURE — 3078F DIAST BP <80 MM HG: CPT | Mod: CPTII,S$GLB,, | Performed by: INTERNAL MEDICINE

## 2024-02-23 PROCEDURE — 1160F RVW MEDS BY RX/DR IN RCRD: CPT | Mod: CPTII,S$GLB,, | Performed by: INTERNAL MEDICINE

## 2024-02-23 NOTE — PATIENT INSTRUCTIONS
Tips for Healthy Living and Routine Preventative Care - 2024                                                            (These guidelines are intended for healthy adults)      1. Exercise  Exercise aerobically with a target heart rate of (220-age) x 0.8  Exercise 30-45 minutes on most days of the week    2. Diet and Supplements- All supplements can be obtained through a varied, healthy diet   Calcium: 1,000 - 1,200 mg each day   8 oz milk or Calcium fortified O.J. = 300, 8 oz Yogurt = 400 mg, 1 oz of cheese =100-200 mg              8 oz Oatmeal = 215 mg, 3 oz [place] = 240 mg  Vitamin D: 800 iu each day- Can probably be obtained by 30 min. of direct sunlight    each day             3 oz. [place] = 800 iu,  3 oz. Tuna =150 iu, Milk or fortified O.J. = 120 iu  Fish oil: 1-2 grams each day or about 840 mg of EPA and DHA (Omega-3 fatty acids) each day             3 oz. [place]=2 grams,  3 oz. Tuna=1.3 grams,  3 oz. drained light Tuna= 0.25 grams  Folic acid 800 mcg each day for all women planning or capable of pregnancy    3. Lifestyle  Alcohol: 1 drink = 12 oz. domestic beer, 4 oz. wine, or 1 oz. hard (80 proof) liquor             Males: </= 14 drinks per week with no more than 4 in any one day             Females: </= 7 drinks per week with no more than 3 in any one day  Salt: 1.2 - 3 grams of Sodium each day.  Tobacco: Dont smoke, or quit smoking (discuss with your doctor)  Depression: If you feel depressed discuss with your doctor  Weight: Maintain a healthy body weight. Stay within 10% of:             Males: 106 lbs. + 6 lbs per inch height above 5 feet             Females: 100 lbs + 5 lbs per inch height above 5 feet    4. Routine tests  Blood pressure check at each visit, or at least once each year  HIV screening (one time) if less than 65 years old  Hepatitis C screen (one time) if less than 80 years old  Cholesterol screening every 3 years starting at age 21  Glucose/Hemaglobin A1C check every 3 years starting at  age 35.  TSH (thyroid screen) every 2 years starting at age 50  Colonoscopy at age 45, and repeat every 10 years until age 75. Consider screening until age 85. DNA stool test (Cologuard) every 3 years is an acceptable alternative.  Vision screen at age 65    Females:  Gyn exam with cervical HPV test every 3 years or Pap smear every three years starting at age 25                  Stop screening at age 65 if past 3 exams were normal                  No screening for women who have had a hysterectomy with removal of cervix  Mammogram every 1-2 years starting at age 40 until age 75  Consider continuing Mammograms every other year for those older than 75 with a life expectancy of more than 10 years  Bone density scan at about age 65    Males:  PSA screening annually at age 50, age 45 for  Americans, until age 75. Consider annual screening after age 75                   5. Immunizations  Influenza vaccine every year in the fall, especially if >50 or with a chronic disease  Consider getting a Covid booster vaccine annually  Tetanus/Diphtheria/Pertussis (Tdap) vaccine once (after the age of 18), then Tetanus/Diphtheria (Td) or Tdap vaccine every 10 years  Shingles (Shingrix) vaccine after age 50 and get a 2nd dose after 2-6 months  RSV (respiratory syncytial virus) vaccine after age 60  Pneumonia vaccine (Prevnar-20) at age 65       6. Advanced Directive/End of life care planning  You should consider having a signed document which informs physicians and family of your end of life care wishes.  You can go to [employer]/DNR/[place]. Under Step 1 click download AdobePDF and print.  This is the [place] physician order for scope of Treatment ([place]Post) form.  You can also request a blank copy of the [employer]Post form from my office.  Bring a copy of the signed document to my office so we can scan it in your medical chart.

## 2024-02-23 NOTE — PROGRESS NOTES
"Subjective     Patient ID: Maggie Evans is a 53 y.o. female.    Chief Complaint: Annual Exam    She "fasted" for 3 weeks during the month of January.  She only ate fruits and vegetables.  She lost 12 pounds and is trying to maintain the weight loss.      Adult Wellness Exam:    Mental Conditions: None  Depression Risk Factors: None  BMI: See Vital signs   Colon screen:    See Health Maintenance Report      Females: Mammogram and PAP: per Gyn                                 Vaccines (Flu, Adacel, Shingrix): See Health Maintenance Report  Routine labs (Cholesterol, Glucose/Hgb A1C, and TSH): ordered.     The patient's current health status is: Good   Patient was educated on routine health maintenance. See Patient Instructions.                                 Review of Systems   Constitutional: Negative.    Respiratory:  Negative for shortness of breath.    Cardiovascular:  Negative for chest pain.          Objective     Physical Exam  Vitals and nursing note reviewed.   Constitutional:       Appearance: She is well-developed.   HENT:      Head: Normocephalic.      Right Ear: Tympanic membrane and ear canal normal.   Eyes:      Pupils: Pupils are equal, round, and reactive to light.   Cardiovascular:      Rate and Rhythm: Normal rate and regular rhythm.      Heart sounds: Normal heart sounds.   Pulmonary:      Effort: Pulmonary effort is normal.   Neurological:      Mental Status: She is alert.            Assessment and Plan     1. Routine adult health maintenance        Plan:  Per orders and D/C instructions.  She is healthy.  Check routine labs.       Follow up in about 1 year (around 2/23/2025).    "

## 2024-04-24 ENCOUNTER — HOSPITAL ENCOUNTER (OUTPATIENT)
Dept: RADIOLOGY | Facility: OTHER | Age: 54
Discharge: HOME | End: 2024-04-24
Attending: OBSTETRICS & GYNECOLOGY
Payer: COMMERCIAL

## 2024-04-24 DIAGNOSIS — Z12.31 VISIT FOR SCREENING MAMMOGRAM: ICD-10-CM

## 2024-04-24 PROCEDURE — 77063 BREAST TOMOSYNTHESIS BI: CPT | Mod: TC

## 2024-04-24 PROCEDURE — 77067 SCR MAMMO BI INCL CAD: CPT | Mod: 26,,, | Performed by: RADIOLOGY

## 2024-04-24 PROCEDURE — 77063 BREAST TOMOSYNTHESIS BI: CPT | Mod: 26,,, | Performed by: RADIOLOGY

## 2025-02-24 ENCOUNTER — LAB VISIT (OUTPATIENT)
Dept: LAB | Facility: OTHER | Age: 55
End: 2025-02-24
Attending: INTERNAL MEDICINE
Payer: COMMERCIAL

## 2025-02-24 ENCOUNTER — OFFICE VISIT (OUTPATIENT)
Dept: INTERNAL MEDICINE | Facility: CLINIC | Age: 55
End: 2025-02-24
Attending: INTERNAL MEDICINE
Payer: COMMERCIAL

## 2025-02-24 VITALS
BODY MASS INDEX: 26.31 KG/M2 | DIASTOLIC BLOOD PRESSURE: 74 MMHG | OXYGEN SATURATION: 98 % | HEART RATE: 60 BPM | WEIGHT: 143 LBS | SYSTOLIC BLOOD PRESSURE: 112 MMHG | HEIGHT: 62 IN

## 2025-02-24 DIAGNOSIS — R25.1 TREMOR OF LEFT HAND: ICD-10-CM

## 2025-02-24 DIAGNOSIS — R79.89 OTHER SPECIFIED ABNORMAL FINDINGS OF BLOOD CHEMISTRY: ICD-10-CM

## 2025-02-24 DIAGNOSIS — E03.9 HYPOTHYROIDISM, UNSPECIFIED TYPE: ICD-10-CM

## 2025-02-24 DIAGNOSIS — E78.9 DISORDER OF LIPID METABOLISM: ICD-10-CM

## 2025-02-24 DIAGNOSIS — D50.8 OTHER IRON DEFICIENCY ANEMIA: ICD-10-CM

## 2025-02-24 DIAGNOSIS — Z12.11 COLON CANCER SCREENING: ICD-10-CM

## 2025-02-24 DIAGNOSIS — R53.83 FATIGUE, UNSPECIFIED TYPE: Primary | ICD-10-CM

## 2025-02-24 DIAGNOSIS — Z00.00 ROUTINE ADULT HEALTH MAINTENANCE: ICD-10-CM

## 2025-02-24 DIAGNOSIS — R53.83 FATIGUE, UNSPECIFIED TYPE: ICD-10-CM

## 2025-02-24 DIAGNOSIS — E55.9 VITAMIN D DEFICIENCY: ICD-10-CM

## 2025-02-24 DIAGNOSIS — D51.0 PERNICIOUS ANEMIA: ICD-10-CM

## 2025-02-24 DIAGNOSIS — Z00.00 ROUTINE ADULT HEALTH MAINTENANCE: Primary | ICD-10-CM

## 2025-02-24 LAB
25(OH)D3+25(OH)D2 SERPL-MCNC: 49 NG/ML (ref 30–96)
ALBUMIN SERPL BCP-MCNC: 3.8 G/DL (ref 3.5–5.2)
ALP SERPL-CCNC: 89 U/L (ref 40–150)
ALT SERPL W/O P-5'-P-CCNC: 53 U/L (ref 10–44)
ANION GAP SERPL CALC-SCNC: 9 MMOL/L (ref 8–16)
AST SERPL-CCNC: 37 U/L (ref 10–40)
BASOPHILS # BLD AUTO: 0.04 K/UL (ref 0–0.2)
BASOPHILS NFR BLD: 1.2 % (ref 0–1.9)
BILIRUB SERPL-MCNC: 0.4 MG/DL (ref 0.1–1)
BILIRUB UR QL STRIP: NEGATIVE
BUN SERPL-MCNC: 12 MG/DL (ref 6–20)
CALCIUM SERPL-MCNC: 9.5 MG/DL (ref 8.7–10.5)
CHLORIDE SERPL-SCNC: 106 MMOL/L (ref 95–110)
CHOLEST SERPL-MCNC: 218 MG/DL (ref 120–199)
CHOLEST/HDLC SERPL: 2.9 {RATIO} (ref 2–5)
CLARITY UR: CLEAR
CO2 SERPL-SCNC: 25 MMOL/L (ref 23–29)
COLOR UR: COLORLESS
CREAT SERPL-MCNC: 0.7 MG/DL (ref 0.5–1.4)
DIFFERENTIAL METHOD BLD: ABNORMAL
EOSINOPHIL # BLD AUTO: 0.1 K/UL (ref 0–0.5)
EOSINOPHIL NFR BLD: 2.1 % (ref 0–8)
ERYTHROCYTE [DISTWIDTH] IN BLOOD BY AUTOMATED COUNT: 14.6 % (ref 11.5–14.5)
EST. GFR  (NO RACE VARIABLE): >60 ML/MIN/1.73 M^2
ESTIMATED AVG GLUCOSE: 111 MG/DL (ref 68–131)
ESTRADIOL SERPL-MCNC: 11 PG/ML
FSH SERPL-ACNC: 79.68 MIU/ML
GLUCOSE SERPL-MCNC: 102 MG/DL (ref 70–110)
GLUCOSE UR QL STRIP: NEGATIVE
HBA1C MFR BLD: 5.5 % (ref 4–5.6)
HCT VFR BLD AUTO: 36.6 % (ref 37–48.5)
HDLC SERPL-MCNC: 74 MG/DL (ref 40–75)
HDLC SERPL: 33.9 % (ref 20–50)
HGB BLD-MCNC: 12.6 G/DL (ref 12–16)
HGB UR QL STRIP: NEGATIVE
IMM GRANULOCYTES # BLD AUTO: 0 K/UL (ref 0–0.04)
IMM GRANULOCYTES NFR BLD AUTO: 0 % (ref 0–0.5)
KETONES UR QL STRIP: NEGATIVE
LDLC SERPL CALC-MCNC: 127.6 MG/DL (ref 63–159)
LEUKOCYTE ESTERASE UR QL STRIP: NEGATIVE
LYMPHOCYTES # BLD AUTO: 1.2 K/UL (ref 1–4.8)
LYMPHOCYTES NFR BLD: 34.5 % (ref 18–48)
MCH RBC QN AUTO: 26.2 PG (ref 27–31)
MCHC RBC AUTO-ENTMCNC: 34.4 G/DL (ref 32–36)
MCV RBC AUTO: 76 FL (ref 82–98)
MONOCYTES # BLD AUTO: 0.3 K/UL (ref 0.3–1)
MONOCYTES NFR BLD: 8.7 % (ref 4–15)
NEUTROPHILS # BLD AUTO: 1.8 K/UL (ref 1.8–7.7)
NEUTROPHILS NFR BLD: 53.5 % (ref 38–73)
NITRITE UR QL STRIP: NEGATIVE
NONHDLC SERPL-MCNC: 144 MG/DL
NRBC BLD-RTO: 0 /100 WBC
PH UR STRIP: 6 [PH] (ref 5–8)
PLATELET # BLD AUTO: 277 K/UL (ref 150–450)
PMV BLD AUTO: 9.9 FL (ref 9.2–12.9)
POTASSIUM SERPL-SCNC: 4.2 MMOL/L (ref 3.5–5.1)
PROT SERPL-MCNC: 7.7 G/DL (ref 6–8.4)
PROT UR QL STRIP: NEGATIVE
RBC # BLD AUTO: 4.81 M/UL (ref 4–5.4)
SODIUM SERPL-SCNC: 140 MMOL/L (ref 136–145)
SP GR UR STRIP: 1.01 (ref 1–1.03)
TRIGL SERPL-MCNC: 82 MG/DL (ref 30–150)
TSH SERPL DL<=0.005 MIU/L-ACNC: 2.18 UIU/ML (ref 0.4–4)
URN SPEC COLLECT METH UR: ABNORMAL
VIT B12 SERPL-MCNC: 372 PG/ML (ref 210–950)
WBC # BLD AUTO: 3.33 K/UL (ref 3.9–12.7)

## 2025-02-24 PROCEDURE — 82670 ASSAY OF TOTAL ESTRADIOL: CPT | Performed by: INTERNAL MEDICINE

## 2025-02-24 PROCEDURE — 80061 LIPID PANEL: CPT | Performed by: INTERNAL MEDICINE

## 2025-02-24 PROCEDURE — 82306 VITAMIN D 25 HYDROXY: CPT | Performed by: INTERNAL MEDICINE

## 2025-02-24 PROCEDURE — 83036 HEMOGLOBIN GLYCOSYLATED A1C: CPT | Performed by: INTERNAL MEDICINE

## 2025-02-24 PROCEDURE — 85025 COMPLETE CBC W/AUTO DIFF WBC: CPT | Performed by: INTERNAL MEDICINE

## 2025-02-24 PROCEDURE — 82607 VITAMIN B-12: CPT | Performed by: INTERNAL MEDICINE

## 2025-02-24 PROCEDURE — 84443 ASSAY THYROID STIM HORMONE: CPT | Performed by: INTERNAL MEDICINE

## 2025-02-24 PROCEDURE — 83001 ASSAY OF GONADOTROPIN (FSH): CPT | Performed by: INTERNAL MEDICINE

## 2025-02-24 PROCEDURE — 81003 URINALYSIS AUTO W/O SCOPE: CPT | Performed by: INTERNAL MEDICINE

## 2025-02-24 PROCEDURE — 80053 COMPREHEN METABOLIC PANEL: CPT | Performed by: INTERNAL MEDICINE

## 2025-02-24 NOTE — PATIENT INSTRUCTIONS
Tips for Healthy Living and Routine Preventative Care - 2025                                                            (These guidelines are intended for healthy adults)      1. Exercise  Exercise aerobically with a target heart rate of (220-age) x 0.8  Exercise 30-45 minutes on most days of the week    2. Diet and Supplements- All supplements can be obtained through a varied, healthy diet   Calcium: 1,000 - 1,200 mg each day   8 oz milk or Calcium fortified O.J. = 300, 8 oz Yogurt = 400 mg, 1 oz of cheese =100-200 mg              8 oz Oatmeal = 215 mg, 3 oz [place] = 240 mg  Vitamin D: 800 iu each day- Can probably be obtained by 30 min. of direct sunlight    each day             3 oz. [place] = 800 iu,  3 oz. Tuna =150 iu, Milk or fortified O.J. = 120 iu  Fish oil: 1-2 grams each day or about 840 mg of EPA and DHA (Omega-3 fatty acids) each day             3 oz. [place]=2 grams,  3 oz. Tuna=1.3 grams,  3 oz. drained light Tuna= 0.25 grams  Folic acid 800 mcg each day for all women planning or capable of pregnancy    3. Lifestyle  Alcohol: 1 drink = 12 oz. domestic beer, 4 oz. wine, or 1 oz. hard (80 proof) liquor             Males: </= 14 drinks per week with no more than 4 in any one day             Females: </= 7 drinks per week with no more than 3 in any one day  Salt: 1.2 - 3 grams of Sodium each day.  Tobacco: Dont smoke, or quit smoking (discuss with your doctor)  Depression: If you feel depressed discuss with your doctor  Weight: Maintain a healthy body weight. Stay within 10% of:             Males: 106 lbs. + 6 lbs per inch height above 5 feet             Females: 100 lbs + 5 lbs per inch height above 5 feet    4. Routine tests  Blood pressure check at each visit, or at least once each year  HIV screening (one time) if less than 65 years old  Hepatitis C screen (one time) if less than 80 years old  Cholesterol screening every 3 years starting at age 21  Glucose/Hemaglobin A1C check every 3 years starting at  age 35.  TSH (thyroid screen) every 2 years starting at age 50  Colonoscopy at age 45, and repeat every 10 years until age 75. Consider screening until age 85. DNA stool test (Cologuard) every 3 years is an acceptable alternative.  Vision screen at age 65    Females:  Gyn exam with cervical HPV test every 3 years or Pap smear every three years starting at age 25                  Stop screening at age 65 if past 3 exams were normal                  No screening for women who have had a hysterectomy with removal of cervix  Mammogram every 1-2 years starting at age 40 until age 75  Consider continuing Mammograms every other year for those older than 75 with a life expectancy of more than 10 years  Bone density scan at about age 65    Males:  PSA screening annually at age 50, age 45 for  Americans, until age 75. Consider annual screening after age 75                   5. Immunizations  Influenza vaccine every year in the fall, especially if >50 or with a chronic disease  Consider getting a Covid booster vaccine annually  Tetanus/Diphtheria/Pertussis (Tdap) vaccine once (after the age of 18), then Tetanus/Diphtheria (Td) or Tdap vaccine every 10 years  Shingles (Shingrix) vaccine after age 50 and get a 2nd dose after 2-6 months  RSV (respiratory syncytial virus) vaccine after age 60  Pneumonia vaccine (Prevnar-20) at age 65       6. Advanced Directive/End of life care planning  You should consider having a signed document which informs physicians and family of your end of life care wishes.  You can go to [employer]/DNR/[place]. Under Step 1 click download AdobePDF and print.  This is the [place] physician order for scope of Treatment ([place]) form.  You can also request a blank copy of the [employer] form from my office.  Bring a copy of the signed document to my office so we can scan it in your medical chart.

## 2025-02-24 NOTE — PROGRESS NOTES
Subjective     Patient ID: Maggie Evans is a 54 y.o. female.    Chief Complaint: Annual Exam (Mammo and Colonoscopy orders )    She has an occasional tremor of her left hand.        Adult Wellness Exam:    Mental Conditions: None  Depression Risk Factors: None  BMI: See Vital signs   Colon screen:    See Health Maintenance Report      Females: Mammogram and PAP: per Gyn                                 Vaccines (Flu, Adacel, Shingrix): See Health Maintenance Report  Routine labs (Cholesterol, Glucose/Hgb A1C, and TSH): ordered.     The patient's current health status is: Good   Patient was educated on routine health maintenance. See Patient Instructions.                               Review of Systems   Neurological:  Positive for tremors.          Objective     Physical Exam  Vitals and nursing note reviewed.   Constitutional:       Appearance: She is well-developed.   HENT:      Head: Normocephalic.      Right Ear: Tympanic membrane and ear canal normal.   Eyes:      Pupils: Pupils are equal, round, and reactive to light.   Cardiovascular:      Rate and Rhythm: Normal rate and regular rhythm.      Heart sounds: Normal heart sounds.   Pulmonary:      Effort: Pulmonary effort is normal.   Neurological:      Mental Status: She is alert.            Assessment and Plan     1. Routine adult health maintenance    2. Tremor of left hand  -     Ambulatory referral/consult to Neurology; Future; Expected date: 03/03/2025    3. Colon cancer screening  -     Cologuard Screening (Multitarget Stool DNA); Future; Expected date: 02/24/2025        PLAN:  Per orders and D/C instructions.  Refer to neurology for mild left hand tremor.  Cologuard ordered.  Check routine labs with estrogen level FSH, and UA per patient request.         Follow up in about 1 year (around 2/24/2026).

## 2025-02-25 ENCOUNTER — RESULTS FOLLOW-UP (OUTPATIENT)
Dept: INTERNAL MEDICINE | Facility: CLINIC | Age: 55
End: 2025-02-25

## 2025-05-02 ENCOUNTER — RESULTS FOLLOW-UP (OUTPATIENT)
Dept: INTERNAL MEDICINE | Facility: CLINIC | Age: 55
End: 2025-05-02

## 2025-05-02 DIAGNOSIS — Z12.31 VISIT FOR SCREENING MAMMOGRAM: Primary | ICD-10-CM

## 2025-08-28 ENCOUNTER — HOSPITAL ENCOUNTER (OUTPATIENT)
Dept: RADIOLOGY | Facility: OTHER | Age: 55
Discharge: HOME | End: 2025-08-28
Attending: INTERNAL MEDICINE
Payer: COMMERCIAL

## 2025-08-28 VITALS — WEIGHT: 143 LBS | BODY MASS INDEX: 26.31 KG/M2 | HEIGHT: 62 IN

## 2025-08-28 DIAGNOSIS — Z12.31 VISIT FOR SCREENING MAMMOGRAM: ICD-10-CM

## 2025-08-28 PROCEDURE — 77067 SCR MAMMO BI INCL CAD: CPT | Mod: 26,,, | Performed by: RADIOLOGY

## 2025-08-28 PROCEDURE — 77067 SCR MAMMO BI INCL CAD: CPT | Mod: TC

## 2025-08-28 PROCEDURE — 77063 BREAST TOMOSYNTHESIS BI: CPT | Mod: 26,,, | Performed by: RADIOLOGY
